# Patient Record
Sex: FEMALE | Race: WHITE | NOT HISPANIC OR LATINO | Employment: FULL TIME | URBAN - NONMETROPOLITAN AREA
[De-identification: names, ages, dates, MRNs, and addresses within clinical notes are randomized per-mention and may not be internally consistent; named-entity substitution may affect disease eponyms.]

---

## 2017-02-18 ENCOUNTER — HOSPITAL ENCOUNTER (EMERGENCY)
Facility: HOSPITAL | Age: 42
Discharge: HOME | End: 2017-02-18
Attending: EMERGENCY MEDICINE | Admitting: EMERGENCY MEDICINE

## 2017-02-18 VITALS
TEMPERATURE: 97.9 F | RESPIRATION RATE: 18 BRPM | DIASTOLIC BLOOD PRESSURE: 79 MMHG | HEIGHT: 66 IN | BODY MASS INDEX: 22.5 KG/M2 | SYSTOLIC BLOOD PRESSURE: 122 MMHG | OXYGEN SATURATION: 99 % | HEART RATE: 99 BPM | WEIGHT: 140 LBS

## 2017-02-18 DIAGNOSIS — K02.9 PAIN DUE TO DENTAL CARIES: Primary | ICD-10-CM

## 2017-02-18 PROCEDURE — 99283 EMERGENCY DEPT VISIT LOW MDM: CPT

## 2017-02-18 RX ORDER — PENICILLIN V POTASSIUM 500 MG/1
500 TABLET ORAL 4 TIMES DAILY
Qty: 40 TABLET | Refills: 0 | Status: SHIPPED | OUTPATIENT
Start: 2017-02-18 | End: 2017-02-28

## 2017-02-18 RX ORDER — DICLOFENAC SODIUM 75 MG/1
75 TABLET, DELAYED RELEASE ORAL 2 TIMES DAILY PRN
Qty: 20 TABLET | Refills: 0 | Status: SHIPPED | OUTPATIENT
Start: 2017-02-18 | End: 2018-12-03

## 2017-02-18 RX ORDER — PENICILLIN V POTASSIUM 250 MG/1
500 TABLET ORAL ONCE
Status: COMPLETED | OUTPATIENT
Start: 2017-02-18 | End: 2017-02-18

## 2017-02-18 RX ORDER — NAPROXEN 250 MG/1
500 TABLET ORAL ONCE
Status: COMPLETED | OUTPATIENT
Start: 2017-02-18 | End: 2017-02-18

## 2017-02-18 RX ADMIN — PENICILLIN V POTASSIUM 500 MG: 250 TABLET ORAL at 08:55

## 2017-02-18 RX ADMIN — BENZOCAINE: 200 LIQUID DENTAL; ORAL; PERIODONTAL at 08:55

## 2017-02-18 RX ADMIN — NAPROXEN 500 MG: 250 TABLET ORAL at 08:55

## 2017-02-18 NOTE — ED PROVIDER NOTES
Subjective   Patient is a 41 y.o. female presenting with tooth pain.   History provided by:  Patient   used: No    Dental Pain   Location:  Generalized  Quality:  Aching, constant and throbbing  Severity:  Moderate  Onset quality:  Gradual  Duration:  3 weeks  Timing:  Intermittent  Progression:  Worsening  Chronicity:  Recurrent  Context: dental caries    Context: not abscess, cap still on, not crown fracture, not dental fracture, not enamel fracture, filling intact, not intrusion, not malocclusion, normal dentition, not recent dental surgery and not trauma    Relieved by:  Nothing  Worsened by:  Nothing  Ineffective treatments:  None tried  Associated symptoms: facial pain and gum swelling    Associated symptoms: no congestion, no difficulty swallowing, no drooling, no facial swelling, no fever, no headaches, no neck pain, no neck swelling, no oral bleeding, no oral lesions and no trismus    Risk factors: no alcohol problem, no cancer, no chewing tobacco use, no diabetes, no immunosuppression, sufficient dental care, no periodontal disease and no smoking        Review of Systems   Constitutional: Negative for fever.   HENT: Negative for congestion, drooling, facial swelling and mouth sores.    Musculoskeletal: Negative for neck pain.   Neurological: Negative for headaches.   All other systems reviewed and are negative.      Past Medical History   Diagnosis Date   • Cancer      surgery to remove cervical cancer       Allergies   Allergen Reactions   • Codeine      States she does not like the way it makes her feel       Past Surgical History   Procedure Laterality Date   • Dilatation and curettage     • Tubal abdominal ligation         History reviewed. No pertinent family history.    Social History     Social History   • Marital status:      Spouse name: N/A   • Number of children: N/A   • Years of education: N/A     Social History Main Topics   • Smoking status: Current Every Day  Smoker     Packs/day: 1.00     Types: Cigarettes   • Smokeless tobacco: None   • Alcohol use No   • Drug use: No   • Sexual activity: Defer     Other Topics Concern   • None     Social History Narrative   • None           Objective   Physical Exam   Constitutional: She is oriented to person, place, and time. Vital signs are normal. She appears well-developed and well-nourished.   HENT:   Head: Normocephalic and atraumatic.   Right Ear: External ear normal.   Left Ear: External ear normal.   Nose: Nose normal.   Mouth/Throat: Oropharynx is clear and moist. Dental caries present.       Eyes: Conjunctivae and EOM are normal. Pupils are equal, round, and reactive to light.   Neck: Normal range of motion.   Cardiovascular: Normal rate and normal heart sounds.    Pulmonary/Chest: Effort normal and breath sounds normal.   Abdominal: Soft.   Neurological: She is oriented to person, place, and time.   Skin: Skin is warm.   Nursing note and vitals reviewed.      Procedures         ED Course  ED Course                  MDM  Number of Diagnoses or Management Options  Pain due to dental caries: new and does not require workup     Amount and/or Complexity of Data Reviewed  Tests in the medicine section of CPT®: ordered and reviewed    Risk of Complications, Morbidity, and/or Mortality  Presenting problems: moderate  Diagnostic procedures: moderate  Management options: moderate    Patient Progress  Patient progress: stable      Final diagnoses:   Pain due to dental caries            MOE Zamarripa  02/18/17 1537

## 2018-12-03 ENCOUNTER — OFFICE VISIT (OUTPATIENT)
Dept: FAMILY MEDICINE CLINIC | Facility: CLINIC | Age: 43
End: 2018-12-03

## 2018-12-03 VITALS
BODY MASS INDEX: 28.53 KG/M2 | DIASTOLIC BLOOD PRESSURE: 84 MMHG | HEART RATE: 86 BPM | SYSTOLIC BLOOD PRESSURE: 123 MMHG | TEMPERATURE: 98.2 F | WEIGHT: 177.5 LBS | HEIGHT: 66 IN

## 2018-12-03 DIAGNOSIS — S46.911A STRAIN OF RIGHT SHOULDER, INITIAL ENCOUNTER: Primary | ICD-10-CM

## 2018-12-03 PROBLEM — I21.9 MYOCARDIAL INFARCTION: Status: ACTIVE | Noted: 2018-12-03

## 2018-12-03 PROBLEM — S66.919A: Status: ACTIVE | Noted: 2018-12-03

## 2018-12-03 PROBLEM — C53.9 MALIGNANT NEOPLASM OF CERVIX (HCC): Status: ACTIVE | Noted: 2018-12-03

## 2018-12-03 PROCEDURE — 99214 OFFICE O/P EST MOD 30 MIN: CPT | Performed by: FAMILY MEDICINE

## 2018-12-03 RX ORDER — NAPROXEN 500 MG/1
500 TABLET ORAL 2 TIMES DAILY WITH MEALS
Qty: 30 TABLET | Refills: 1 | Status: SHIPPED | OUTPATIENT
Start: 2018-12-03 | End: 2018-12-06

## 2018-12-03 NOTE — PROGRESS NOTES
Subjective   Vicki Dietrich is a 43 y.o. female.     History of Present Illness earlier this a.m. 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  The resident almost fell and had to bear significant weight with right shoulder.  Pain about the right shoulder mechanical in nature.  No direct trauma.  Did not fall.  Denies neck axial spine elbow wrist difficulties.  Denies chest.  Has had no prior shoulder difficulties.  Has had prior back difficulties.    The following portions of the patient's history were reviewed and updated as appropriate: allergies, past family history, past medical history, past social history, past surgical history and problem list.    Review of Systems see the history of present illness    Objective   Physical Exam   Constitutional: She is oriented to person, place, and time. She appears well-developed and well-nourished.   HENT:   Head: Normocephalic.   Mouth/Throat: Oropharynx is clear and moist.   Neck: Normal range of motion. Neck supple.   Cardiovascular: Normal rate, regular rhythm and normal heart sounds.   Pulmonary/Chest: Effort normal and breath sounds normal.   Musculoskeletal: She exhibits no edema.   Painful range of motion right shoulder in all planes.  Soft tissue tenderness diffusely.  Decreased active passive range of motion secondary to pain.   Neurological: She is alert and oriented to person, place, and time. She displays normal reflexes.   Skin: Skin is warm and dry.   Psychiatric: She has a normal mood and affect. Her behavior is normal.   Vitals reviewed.      Assessment/Plan   Vicki was seen today for shoulder injury.    Diagnoses and all orders for this visit:    Strain of right shoulder, initial encounter  -     naproxen (NAPROSYN) 500 MG tablet; Take 1 tablet by mouth 2 (Two) Times a Day With Meals.     Will return to work with restrictions of  light duty no lifting.  Recheck in 3 days.  Use episodic icing and/or heat for comfort.  Discussed gentle range of motion activities.  Is the anti-inflammatory as directed.  I do not believe imaging is indicated at this time.  Likely a self-limited soft tissue injury.    Patient's Body mass index is 28.66 kg/m². BMI is above normal parameters. Recommendations include: referral to primary care.

## 2018-12-06 ENCOUNTER — OFFICE VISIT (OUTPATIENT)
Dept: FAMILY MEDICINE CLINIC | Facility: CLINIC | Age: 43
End: 2018-12-06

## 2018-12-06 VITALS
SYSTOLIC BLOOD PRESSURE: 131 MMHG | BODY MASS INDEX: 28.67 KG/M2 | HEIGHT: 66 IN | DIASTOLIC BLOOD PRESSURE: 82 MMHG | WEIGHT: 178.4 LBS | TEMPERATURE: 97.6 F | HEART RATE: 71 BPM

## 2018-12-06 DIAGNOSIS — S46.911A STRAIN OF RIGHT SHOULDER, INITIAL ENCOUNTER: Primary | ICD-10-CM

## 2018-12-06 PROCEDURE — 99213 OFFICE O/P EST LOW 20 MIN: CPT | Performed by: FAMILY MEDICINE

## 2018-12-06 RX ORDER — IBUPROFEN 600 MG/1
600 TABLET ORAL EVERY 6 HOURS PRN
Qty: 45 TABLET | Refills: 1 | Status: SHIPPED | OUTPATIENT
Start: 2018-12-06 | End: 2019-08-01

## 2019-08-01 ENCOUNTER — OFFICE VISIT (OUTPATIENT)
Dept: FAMILY MEDICINE CLINIC | Facility: CLINIC | Age: 44
End: 2019-08-01

## 2019-08-01 VITALS
SYSTOLIC BLOOD PRESSURE: 142 MMHG | WEIGHT: 177.5 LBS | BODY MASS INDEX: 28.53 KG/M2 | HEART RATE: 103 BPM | HEIGHT: 66 IN | DIASTOLIC BLOOD PRESSURE: 90 MMHG | TEMPERATURE: 98.7 F | OXYGEN SATURATION: 98 %

## 2019-08-01 DIAGNOSIS — M54.16 LUMBAR BACK PAIN WITH RADICULOPATHY AFFECTING LEFT LOWER EXTREMITY: Primary | ICD-10-CM

## 2019-08-01 PROCEDURE — 99214 OFFICE O/P EST MOD 30 MIN: CPT | Performed by: NURSE PRACTITIONER

## 2019-08-01 RX ORDER — IBUPROFEN 800 MG/1
800 TABLET ORAL EVERY 6 HOURS PRN
Qty: 90 TABLET | Refills: 0 | Status: SHIPPED | OUTPATIENT
Start: 2019-08-01 | End: 2019-09-05

## 2019-08-01 RX ORDER — CYCLOBENZAPRINE HCL 10 MG
10 TABLET ORAL 3 TIMES DAILY PRN
Qty: 90 TABLET | Refills: 0 | Status: SHIPPED | OUTPATIENT
Start: 2019-08-01 | End: 2019-09-05

## 2019-08-01 NOTE — PATIENT INSTRUCTIONS

## 2019-08-01 NOTE — PROGRESS NOTES
Noel Dietrich is a 43 y.o. female.     Chief Complaint   Patient presents with   • back injury (WC)       She presents with c/o back pain. She states she was pushing a resident in a low profile wheelchair and pulled her back and developed back pain. She describes the pain as sharp in her low back and running down the side of her left leg. She states the pain went away and when she came back she was taking a resident to the bathroom that she had to lift a little bit and the pain came and she couldn't move. She states she grabbed something and held on to it. She states she has felt paralyzed before about 3 or 4 years ago. She denies any popping sound. She states she has not taken any medicine. She rates her pain at 8 on a scale of 0-10 right now.          The following portions of the patient's history were reviewed and updated as appropriate: allergies, current medications, past family history, past medical history, past social history, past surgical history and problem list.    Review of Systems   Constitutional: Negative for fatigue, fever and unexpected weight change.   HENT: Negative for ear pain, rhinorrhea and sore throat.    Eyes: Negative for discharge, itching and visual disturbance.   Respiratory: Negative for cough, chest tightness and shortness of breath.    Cardiovascular: Negative for chest pain, palpitations and leg swelling.   Gastrointestinal: Negative for abdominal pain, blood in stool, constipation, diarrhea, nausea and vomiting.   Endocrine: Negative for cold intolerance and heat intolerance.   Genitourinary: Negative for dysuria and hematuria.   Musculoskeletal: Positive for arthralgias and back pain. Negative for myalgias.   Skin: Negative for color change and rash.   Allergic/Immunologic: Positive for environmental allergies.   Neurological: Negative for dizziness, seizures, syncope and headaches.   Hematological: Negative for adenopathy.   Psychiatric/Behavioral: Negative for  "suicidal ideas. The patient is not nervous/anxious.        Objective     /90 (BP Location: Left arm, Patient Position: Sitting, Cuff Size: Adult)   Pulse 103   Temp 98.7 °F (37.1 °C) (Oral)   Ht 167.6 cm (65.98\")   Wt 80.5 kg (177 lb 8 oz)   SpO2 98%   BMI 28.66 kg/m²     Physical Exam   Constitutional: She is oriented to person, place, and time. She appears well-developed and well-nourished. No distress.   HENT:   Head: Normocephalic and atraumatic.   Cardiovascular: Normal rate, regular rhythm, normal heart sounds and intact distal pulses. Exam reveals no gallop and no friction rub.   No murmur heard.  Pulmonary/Chest: Effort normal and breath sounds normal. No respiratory distress.   Abdominal: Soft. Bowel sounds are normal. She exhibits no distension. There is no tenderness.   Musculoskeletal: She exhibits no edema.   Left buttock tenderness to palpation, muscle tightness noted. Pain with elevation of left hip.   Neurological: She is alert and oriented to person, place, and time.   Reflex Scores:       Patellar reflexes are 2+ on the right side and 3+ on the left side.  Skin: Skin is warm and dry. She is not diaphoretic.   Psychiatric: She has a normal mood and affect. Her behavior is normal. Judgment and thought content normal.       Assessment/Plan     Problem List Items Addressed This Visit     None      Visit Diagnoses     Lumbar back pain with radiculopathy affecting left lower extremity    -  Primary    Relevant Medications    ibuprofen (ADVIL,MOTRIN) 800 MG tablet    cyclobenzaprine (FLEXERIL) 10 MG tablet    Other Relevant Orders    XR Spine Lumbar 2 or 3 View        Plan: Rest, no lifting, pulling or tugging for one week. Ibuprofen and cyclobenzaprine ordered for pain. Get xray of lumbar spine.  You may alternate ice and heat for your back pain.  Follow up in one week.       Patient's Body mass index is 28.66 kg/m². BMI is above normal parameters. Recommendations include: educational " material.   (Normal BMI:  18.5-24.9, OW 25-29.9, Obesity 30 or greater)    Vicki Dietrich is a current cigarettes user.  She currently smokes 1 pack of cigarettes per day for a duration of 35 years. I have educated her on the risk of diseases from using tobacco products such as cancer, COPD and heart diease.     I advised her to quit and she is not willing to quit.    I spent 3  minutes counseling the patient.          Understands disease processes and need for medications.  Understands reasons for urgent and emergent care.  Patient (& family) verbalized agreement for treatment plan.   Emotional support and active listening provided.  Patient provided time to verbalize feelings.               This document has been electronically signed by WILLIAM Watosn   August 1, 2019 12:53 PM

## 2019-08-08 ENCOUNTER — OFFICE VISIT (OUTPATIENT)
Dept: FAMILY MEDICINE CLINIC | Facility: CLINIC | Age: 44
End: 2019-08-08

## 2019-08-08 VITALS
DIASTOLIC BLOOD PRESSURE: 62 MMHG | HEART RATE: 116 BPM | WEIGHT: 182.1 LBS | OXYGEN SATURATION: 97 % | SYSTOLIC BLOOD PRESSURE: 124 MMHG | BODY MASS INDEX: 29.27 KG/M2 | TEMPERATURE: 98 F | HEIGHT: 66 IN

## 2019-08-08 DIAGNOSIS — M54.50 ACUTE LEFT-SIDED LOW BACK PAIN WITHOUT SCIATICA: Primary | ICD-10-CM

## 2019-08-08 PROCEDURE — 99213 OFFICE O/P EST LOW 20 MIN: CPT | Performed by: NURSE PRACTITIONER

## 2019-08-08 NOTE — PROGRESS NOTES
Subjective   Vicki Dietrich is a 43 y.o. female.     Chief Complaint   Patient presents with   • lumbar back pain with radiculopathy affecting left lower ext       She presents for follow up of low back pain on the left side after an injury at work. She states her pain has improved from an 8 to a 6 on the pain scale. She states the ibuprofen and flexeril help her pain but they make her sleepy. She states she had an xray at [facility]. We do not yet have the report. She states she has not been lifting, pulling, or tugging. She states she has been using ice and a heating pad on her back. She states the pain is no longer in her leg but staying in her back now.          The following portions of the patient's history were reviewed and updated as appropriate: allergies, current medications, past family history, past medical history, past social history, past surgical history and problem list.    Review of Systems   Constitutional: Negative for fatigue, fever and unexpected weight change.   HENT: Negative for ear pain, rhinorrhea and sore throat.    Eyes: Negative for visual disturbance.   Respiratory: Negative for cough, chest tightness and shortness of breath.    Cardiovascular: Negative for chest pain, palpitations and leg swelling.   Gastrointestinal: Negative for abdominal pain, blood in stool, constipation, diarrhea, nausea and vomiting.   Endocrine: Negative for cold intolerance and heat intolerance.   Genitourinary: Negative for dysuria and hematuria.   Musculoskeletal: Positive for back pain. Negative for arthralgias and myalgias.   Skin: Negative for color change.   Allergic/Immunologic: Positive for environmental allergies.   Neurological: Negative for dizziness, seizures, syncope and headaches.   Hematological: Negative for adenopathy.   Psychiatric/Behavioral: Negative for suicidal ideas. The patient is not nervous/anxious.        Objective     /62 (BP Location: Left arm, Patient Position: Sitting,  "Cuff Size: Adult)   Pulse 116   Temp 98 °F (36.7 °C) (Oral)   Ht 167.6 cm (65.98\")   Wt 82.6 kg (182 lb 1.6 oz)   SpO2 97%   BMI 29.41 kg/m²     Physical Exam   Constitutional: She is oriented to person, place, and time. Vital signs are normal. She appears well-developed and well-nourished. No distress.   HENT:   Head: Normocephalic and atraumatic.   Cardiovascular: Normal rate, regular rhythm, S1 normal, S2 normal, normal heart sounds and intact distal pulses. Exam reveals no gallop and no friction rub.   No murmur heard.  Pulmonary/Chest: Effort normal and breath sounds normal. No respiratory distress.   Abdominal: Soft. Bowel sounds are normal. She exhibits no distension. There is no tenderness.   Musculoskeletal: She exhibits no edema.        Lumbar back: She exhibits tenderness (left side).   Neurological: She is alert and oriented to person, place, and time.   Reflex Scores:       Patellar reflexes are 2+ on the right side and 3+ on the left side.  Skin: Skin is warm and dry. She is not diaphoretic.   Psychiatric: She has a normal mood and affect. Her behavior is normal. Judgment and thought content normal.       Assessment/Plan     Problem List Items Addressed This Visit     None      Visit Diagnoses     Acute left-sided low back pain without sciatica    -  Primary    Relevant Orders    Ambulatory Referral to Physical Therapy (Completed)        Plan: Continue to rest and use ice on your back. Continue ibuprofen and flexeril as needed. Attempt to get xray results. Referred to physical therapy for back pain. You may return to work on light duty. Follow up in 4 weeks.       Patient's Body mass index is 29.41 kg/m². BMI is above normal parameters. Recommendations include: educational material.   (Normal BMI:  18.5-24.9, OW 25-29.9, Obesity 30 or greater)    Vicki Dietrich is a current cigarettes user.  She currently smokes 1 pack of cigarettes per day for a duration of 30 years. I have educated her on the " risk of diseases from using tobacco products such as cancer, COPD and heart diease.     I advised her to quit and she is not willing to quit.    I spent 3  minutes counseling the patient.          Understands disease processes and need for medications.  Understands reasons for urgent and emergent care.  Patient (& family) verbalized agreement for treatment plan.   Emotional support and active listening provided.  Patient provided time to verbalize feelings.               This document has been electronically signed by WILLIAM Watson   August 8, 2019 11:44 AM

## 2019-08-08 NOTE — PATIENT INSTRUCTIONS

## 2019-09-05 ENCOUNTER — OFFICE VISIT (OUTPATIENT)
Dept: FAMILY MEDICINE CLINIC | Facility: CLINIC | Age: 44
End: 2019-09-05

## 2019-09-05 VITALS
OXYGEN SATURATION: 98 % | TEMPERATURE: 97.6 F | WEIGHT: 179.6 LBS | DIASTOLIC BLOOD PRESSURE: 84 MMHG | HEIGHT: 66 IN | SYSTOLIC BLOOD PRESSURE: 160 MMHG | BODY MASS INDEX: 28.87 KG/M2 | HEART RATE: 87 BPM

## 2019-09-05 DIAGNOSIS — M54.16 LUMBAR BACK PAIN WITH RADICULOPATHY AFFECTING LEFT LOWER EXTREMITY: ICD-10-CM

## 2019-09-05 PROCEDURE — 99213 OFFICE O/P EST LOW 20 MIN: CPT | Performed by: NURSE PRACTITIONER

## 2019-09-05 RX ORDER — CYCLOBENZAPRINE HCL 10 MG
10 TABLET ORAL 3 TIMES DAILY PRN
Qty: 90 TABLET | Refills: 0 | Status: SHIPPED | OUTPATIENT
Start: 2019-09-05 | End: 2019-10-04 | Stop reason: SDUPTHER

## 2019-09-05 RX ORDER — NABUMETONE 750 MG/1
750 TABLET, FILM COATED ORAL 2 TIMES DAILY PRN
Qty: 60 TABLET | Refills: 0 | Status: SHIPPED | OUTPATIENT
Start: 2019-09-05 | End: 2020-05-29

## 2019-09-05 NOTE — PATIENT INSTRUCTIONS

## 2019-09-05 NOTE — PROGRESS NOTES
Subjective   Vicki Scott is a 43 y.o. female.     Chief Complaint   Patient presents with   • Back Pain       She presents for follow up of left low back pain. She states she has hurt her back twice at work since she was last here. 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 She states you have to pull at the shower chair to get it into the bathroom. She states she didn't report the first time. She states she was pulling the shower chair into the shower. 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 She states it started pulling on her back. She states the second time a lady stood up and started to fall and grabbed her to keep herself from falling. She states her back started hurting in the middle and pain shot down her back. She states her back is still hurting. She states she has been doing physical therapy for about a week. She states she started last Tuesday. She states it is helping a little. She states the ibuprofen puts her to sleep so she can't take it while she works.          The following portions of the patient's history were reviewed and updated as appropriate: allergies, current medications, past family history, past medical history, past social history, past surgical history and problem list.    Review of Systems   Constitutional: Negative for fatigue, fever and unexpected weight change.   HENT: Negative for ear pain, rhinorrhea and sore throat.    Eyes: Negative for visual disturbance.   Respiratory: Negative for cough, chest tightness and shortness of breath.    Cardiovascular: Negative for chest pain, palpitations and leg swelling.   Gastrointestinal: Negative for abdominal pain, blood in stool, constipation, diarrhea, nausea and vomiting.   Endocrine: Negative for cold intolerance and heat intolerance.   Genitourinary: Negative for dysuria and hematuria.   Musculoskeletal: Positive for  "arthralgias, back pain and myalgias.   Skin: Negative for color change.   Allergic/Immunologic: Positive for environmental allergies.   Neurological: Negative for dizziness, seizures, syncope and headaches.   Hematological: Negative for adenopathy.   Psychiatric/Behavioral: Negative for suicidal ideas. The patient is not nervous/anxious.        Objective     /84 (BP Location: Right arm, Patient Position: Sitting, Cuff Size: Adult)   Pulse 87   Temp 97.6 °F (36.4 °C)   Ht 167.6 cm (65.98\")   Wt 81.5 kg (179 lb 9.6 oz)   SpO2 98%   BMI 29.00 kg/m²     Physical Exam   Constitutional: She is oriented to person, place, and time. She appears well-developed and well-nourished. No distress.   HENT:   Head: Normocephalic and atraumatic.   Cardiovascular: Normal rate, regular rhythm, S1 normal, S2 normal, normal heart sounds and intact distal pulses. Exam reveals no gallop and no friction rub.   No murmur heard.  Pulmonary/Chest: Effort normal and breath sounds normal. No respiratory distress.   Abdominal: Soft. Normal appearance and bowel sounds are normal. She exhibits no distension. There is no tenderness.   Musculoskeletal: She exhibits no edema.   Neurological: She is alert and oriented to person, place, and time.   Skin: Skin is warm and dry. She is not diaphoretic.   Psychiatric: She has a normal mood and affect. Her behavior is normal. Judgment and thought content normal.   Vitals reviewed.      Assessment/Plan     Problem List Items Addressed This Visit     None      Visit Diagnoses     Lumbar back pain with radiculopathy affecting left lower extremity        Relevant Medications    cyclobenzaprine (FLEXERIL) 10 MG tablet    nabumetone (RELAFEN) 750 MG tablet          Plan: No work, lifting, pulling, or tugging for one month. Continue physical therapy. Change ibuprofen to nabumetone since ibuprofen makes her sleepy. Follow up in 4 weeks.     Patient's Body mass index is 29 kg/m². BMI is above normal " parameters. Recommendations include: educational material.   (Normal BMI:  18.5-24.9, OW 25-29.9, Obesity 30 or greater)    Vicki Scott is a current cigarettes user.  She currently smokes 1 pack of cigarettes per day for a duration of 30 years. I have educated her on the risk of diseases from using tobacco products such as cancer, COPD and heart diease.     I advised her to quit and she is not willing to quit.    I spent 1 minutes counseling the patient.          Understands disease processes and need for medications.  Understands reasons for urgent and emergent care.  Patient (& family) verbalized agreement for treatment plan.   Emotional support and active listening provided.  Patient provided time to verbalize feelings.               This document has been electronically signed by WILLIAM Watson   September 5, 2019 11:57 AM

## 2019-10-04 ENCOUNTER — OFFICE VISIT (OUTPATIENT)
Dept: FAMILY MEDICINE CLINIC | Facility: CLINIC | Age: 44
End: 2019-10-04

## 2019-10-04 VITALS
WEIGHT: 178.8 LBS | SYSTOLIC BLOOD PRESSURE: 170 MMHG | HEART RATE: 81 BPM | TEMPERATURE: 97.2 F | DIASTOLIC BLOOD PRESSURE: 98 MMHG | BODY MASS INDEX: 28.73 KG/M2 | OXYGEN SATURATION: 99 % | HEIGHT: 66 IN

## 2019-10-04 DIAGNOSIS — M54.16 LUMBAR BACK PAIN WITH RADICULOPATHY AFFECTING LEFT LOWER EXTREMITY: ICD-10-CM

## 2019-10-04 PROCEDURE — 99213 OFFICE O/P EST LOW 20 MIN: CPT | Performed by: NURSE PRACTITIONER

## 2019-10-04 RX ORDER — CYCLOBENZAPRINE HCL 10 MG
10 TABLET ORAL 3 TIMES DAILY PRN
Qty: 90 TABLET | Refills: 0 | Status: SHIPPED | OUTPATIENT
Start: 2019-10-04 | End: 2020-05-29

## 2019-10-04 RX ORDER — LIDOCAINE 50 MG/G
1 PATCH TOPICAL EVERY 24 HOURS
Qty: 30 PATCH | Refills: 5 | Status: SHIPPED | OUTPATIENT
Start: 2019-10-04 | End: 2020-05-29

## 2019-10-04 NOTE — PATIENT INSTRUCTIONS

## 2019-10-07 DIAGNOSIS — M54.50 ACUTE LEFT-SIDED LOW BACK PAIN WITHOUT SCIATICA: Primary | ICD-10-CM

## 2019-11-01 ENCOUNTER — OFFICE VISIT (OUTPATIENT)
Dept: FAMILY MEDICINE CLINIC | Facility: CLINIC | Age: 44
End: 2019-11-01

## 2019-11-01 VITALS
DIASTOLIC BLOOD PRESSURE: 98 MMHG | OXYGEN SATURATION: 97 % | TEMPERATURE: 97.3 F | HEIGHT: 66 IN | SYSTOLIC BLOOD PRESSURE: 160 MMHG | BODY MASS INDEX: 29.28 KG/M2 | HEART RATE: 101 BPM | WEIGHT: 182.2 LBS

## 2019-11-01 DIAGNOSIS — M54.16 LUMBAR BACK PAIN WITH RADICULOPATHY AFFECTING LEFT LOWER EXTREMITY: Primary | ICD-10-CM

## 2019-11-01 PROCEDURE — 99212 OFFICE O/P EST SF 10 MIN: CPT | Performed by: NURSE PRACTITIONER

## 2019-11-01 NOTE — PROGRESS NOTES
"Noel Scott is a 44 y.o. female.     Chief Complaint   Patient presents with   • Back Pain       She presents with c/o left low back pain with radiculopathy. She states the pain is about the same. She states she doesn't have any therapy visits left. Lidocaine patches are helping some. Worker's comp is trying to get her in with an orthopedist.          The following portions of the patient's history were reviewed and updated as appropriate: allergies, current medications, past family history, past medical history, past social history, past surgical history and problem list.    Review of Systems   Constitutional: Negative for fatigue, fever and unexpected weight change.   HENT: Negative for ear pain, rhinorrhea and sore throat.    Eyes: Negative for visual disturbance.   Respiratory: Negative for cough, chest tightness and shortness of breath.    Cardiovascular: Negative for chest pain, palpitations and leg swelling.   Gastrointestinal: Negative for abdominal pain, blood in stool, constipation, diarrhea, nausea and vomiting.   Endocrine: Negative for cold intolerance and heat intolerance.   Genitourinary: Negative for dysuria.   Musculoskeletal: Positive for arthralgias, back pain and myalgias.   Skin: Negative for color change.   Allergic/Immunologic: Negative for environmental allergies.   Neurological: Negative for dizziness and headaches.   Hematological: Negative for adenopathy.   Psychiatric/Behavioral: Negative for suicidal ideas. The patient is not nervous/anxious.        Objective     /98 (BP Location: Right arm, Patient Position: Sitting, Cuff Size: Adult)   Pulse 101   Temp 97.3 °F (36.3 °C)   Ht 167.9 cm (66.1\")   Wt 82.6 kg (182 lb 3.2 oz)   SpO2 97%   BMI 29.32 kg/m²     Physical Exam   Constitutional: She is oriented to person, place, and time. She appears well-developed and well-nourished. No distress.   HENT:   Head: Normocephalic and atraumatic.   Cardiovascular: Normal " rate, regular rhythm, S1 normal, S2 normal, normal heart sounds and intact distal pulses. Exam reveals no gallop and no friction rub.   No murmur heard.  Pulmonary/Chest: Effort normal and breath sounds normal. No respiratory distress.   Abdominal: Soft. Normal appearance and bowel sounds are normal. She exhibits no distension. There is no tenderness.   Musculoskeletal: She exhibits no edema.   Left lumbar tenderness to palpation   Neurological: She is alert and oriented to person, place, and time.   Skin: Skin is warm and dry. She is not diaphoretic.   Psychiatric: She has a normal mood and affect. Her behavior is normal. Judgment and thought content normal.   Vitals reviewed.      Assessment/Plan     Problem List Items Addressed This Visit        Nervous and Auditory    Lumbar back pain with radiculopathy affecting left lower extremity - Primary          Plan: Proceed with referral to orthopedics. Rest. No lifting, pulling, or tugging. No work for now. Follow up in 2 months.    Patient's Body mass index is 29.32 kg/m². BMI is above normal parameters. Recommendations include: educational material.   (Normal BMI:  18.5-24.9, OW 25-29.9, Obesity 30 or greater)    Vicki Scott is a current cigarettes user.  She currently smokes 1 pack of cigarettes per day for a duration of 30 years. I have educated her on the risk of diseases from using tobacco products such as cancer, COPD and heart diease.     I advised her to quit and she is not willing to quit.    I spent 0 minutes counseling the patient.          Understands disease processes and need for medications.  Understands reasons for urgent and emergent care.  Patient (& family) verbalized agreement for treatment plan.   Emotional support and active listening provided.  Patient provided time to verbalize feelings.               This document has been electronically signed by WILLIAM Watson   November 1, 2019 11:03 AM

## 2019-11-01 NOTE — PATIENT INSTRUCTIONS

## 2019-12-11 ENCOUNTER — TELEPHONE (OUTPATIENT)
Dept: FAMILY MEDICINE CLINIC | Facility: CLINIC | Age: 44
End: 2019-12-11

## 2019-12-11 NOTE — TELEPHONE ENCOUNTER
ROSA SAW DR DUBIN, HE SAYS SHE HAS MASS ON OVARY THAT IS CAUSING HER BACK PAIN. SHE IS SEEING GYN TOMORROW.

## 2019-12-19 ENCOUNTER — TELEPHONE (OUTPATIENT)
Dept: FAMILY MEDICINE CLINIC | Facility: CLINIC | Age: 44
End: 2019-12-19

## 2019-12-30 ENCOUNTER — TELEPHONE (OUTPATIENT)
Dept: FAMILY MEDICINE CLINIC | Facility: CLINIC | Age: 44
End: 2019-12-30

## 2019-12-31 ENCOUNTER — OFFICE VISIT (OUTPATIENT)
Dept: FAMILY MEDICINE CLINIC | Facility: CLINIC | Age: 44
End: 2019-12-31

## 2019-12-31 VITALS
SYSTOLIC BLOOD PRESSURE: 138 MMHG | HEIGHT: 66 IN | HEART RATE: 116 BPM | WEIGHT: 183.3 LBS | OXYGEN SATURATION: 99 % | DIASTOLIC BLOOD PRESSURE: 76 MMHG | BODY MASS INDEX: 29.46 KG/M2 | TEMPERATURE: 97.8 F

## 2019-12-31 DIAGNOSIS — M54.16 LUMBAR BACK PAIN WITH RADICULOPATHY AFFECTING LEFT LOWER EXTREMITY: Primary | ICD-10-CM

## 2019-12-31 DIAGNOSIS — N83.201 CYST OF RIGHT OVARY: ICD-10-CM

## 2019-12-31 DIAGNOSIS — D25.9 UTERINE LEIOMYOMA, UNSPECIFIED LOCATION: ICD-10-CM

## 2019-12-31 PROCEDURE — 99213 OFFICE O/P EST LOW 20 MIN: CPT | Performed by: NURSE PRACTITIONER

## 2019-12-31 NOTE — PROGRESS NOTES
"Noel Scott is a 44 y.o. female.     Chief Complaint   Patient presents with   • Back Pain       She presents for follow up of low back pain. She states she saw Dr. Dubin who did an MRI and told her he did not think she needs surgery but ordered her a back brace that she has not received yet. She states he told her she had a mass on her ovary and she saw her gynecologist who told her he did not work on anything but cancer. She states her back still hurts a little. She states the lidoderm patches help the pain some.        The following portions of the patient's history were reviewed and updated as appropriate: allergies, current medications, past family history, past medical history, past social history, past surgical history and problem list.    Review of Systems   Constitutional: Negative for fatigue, fever and unexpected weight change.   HENT: Negative for ear pain, rhinorrhea and sore throat.    Eyes: Negative for visual disturbance.   Respiratory: Negative for cough, chest tightness and shortness of breath.    Cardiovascular: Positive for palpitations. Negative for chest pain and leg swelling.   Gastrointestinal: Negative for abdominal pain, blood in stool, constipation, diarrhea, nausea and vomiting.   Endocrine: Negative for cold intolerance and heat intolerance.   Genitourinary: Negative for dysuria, hematuria and vaginal bleeding.   Musculoskeletal: Positive for arthralgias and back pain. Negative for myalgias.   Skin: Negative for color change.   Allergic/Immunologic: Negative for environmental allergies.   Hematological: Negative for adenopathy.   Psychiatric/Behavioral: Negative for suicidal ideas. The patient is not nervous/anxious.        Objective     /76 (BP Location: Right arm, Patient Position: Sitting, Cuff Size: Adult)   Pulse 116   Temp 97.8 °F (36.6 °C)   Ht 167.9 cm (66.1\")   Wt 83.1 kg (183 lb 4.8 oz)   SpO2 99%   BMI 29.49 kg/m²     Physical Exam   Constitutional: " She is oriented to person, place, and time. She appears well-developed and well-nourished. No distress.   HENT:   Head: Normocephalic and atraumatic.   Cardiovascular: Normal rate, regular rhythm, S1 normal, S2 normal, normal heart sounds and intact distal pulses. Exam reveals no gallop and no friction rub.   No murmur heard.  Pulmonary/Chest: Effort normal and breath sounds normal. No respiratory distress.   Abdominal: Soft. Normal appearance and bowel sounds are normal. She exhibits no distension. There is no tenderness.   Musculoskeletal: She exhibits no edema.   Neurological: She is alert and oriented to person, place, and time.   Skin: Skin is warm and dry. She is not diaphoretic.   Psychiatric: She has a normal mood and affect. Her behavior is normal. Judgment and thought content normal.   Vitals reviewed.      Assessment/Plan     Problem List Items Addressed This Visit        Nervous and Auditory    Lumbar back pain with radiculopathy affecting left lower extremity - Primary    Relevant Orders    Ambulatory Referral to Neurosurgery    Ambulatory Referral to Pain Management      Other Visit Diagnoses     Uterine leiomyoma, unspecified location        Relevant Orders    Ambulatory Referral to Gynecology    Cyst of right ovary        Relevant Orders    Ambulatory Referral to Gynecology          Plan: MRI of lumbar spine reviewed. It reports contact of the exiting left L4 nerve at the far lateral foramen with small associated protrusion. Tiny central, left parasagittal annular rent L5-S1, facet arthrosis L5-S1, L4-5, right ovarian cyst 2.6cm and right uterine fibroid 16mm. Refer to neurosurgery for opinion. Refer to pain management for pain. May return to work with limitations. Refer to gynecology for ovarian cyst and uterine fibroid. Follow up as needed.     Patient's Body mass index is 29.49 kg/m². BMI is above normal parameters. Recommendations include: educational material.   (Normal BMI:  18.5-24.9, OW  25-29.9, Obesity 30 or greater)    Vicki Scott  reports that she has been smoking cigarettes. She has a 30.00 pack-year smoking history. She has never used smokeless tobacco.. I have educated her on the risk of diseases from using tobacco products such as cancer, COPD and heart diease.     I advised her to quit and she is not willing to quit.    I spent 1 minutes counseling the patient.           Understands disease processes and need for medications.  Understands reasons for urgent and emergent care.  Patient (& family) verbalized agreement for treatment plan.   Emotional support and active listening provided.  Patient provided time to verbalize feelings.               This document has been electronically signed by WILLIAM Watson   December 31, 2019 10:54 AM

## 2019-12-31 NOTE — PATIENT INSTRUCTIONS

## 2020-05-28 ENCOUNTER — TRANSCRIBE ORDERS (OUTPATIENT)
Dept: ADMINISTRATIVE | Facility: HOSPITAL | Age: 45
End: 2020-05-28

## 2020-05-28 DIAGNOSIS — Z01.818 OTHER SPECIFIED PRE-OPERATIVE EXAMINATION: Primary | ICD-10-CM

## 2020-05-29 ENCOUNTER — TRANSCRIBE ORDERS (OUTPATIENT)
Dept: ADMINISTRATIVE | Facility: HOSPITAL | Age: 45
End: 2020-05-29

## 2020-05-29 ENCOUNTER — APPOINTMENT (OUTPATIENT)
Dept: PREADMISSION TESTING | Facility: HOSPITAL | Age: 45
End: 2020-05-29

## 2020-05-29 ENCOUNTER — LAB (OUTPATIENT)
Dept: LAB | Facility: HOSPITAL | Age: 45
End: 2020-05-29

## 2020-05-29 DIAGNOSIS — Z01.818 PRE-OPERATIVE CLEARANCE: ICD-10-CM

## 2020-05-29 DIAGNOSIS — Z01.818 PRE-OPERATIVE CLEARANCE: Primary | ICD-10-CM

## 2020-05-29 DIAGNOSIS — Z01.818 OTHER SPECIFIED PRE-OPERATIVE EXAMINATION: ICD-10-CM

## 2020-05-29 LAB
ABO GROUP BLD: NORMAL
BLD GP AB SCN SERPL QL: NEGATIVE
RH BLD: POSITIVE
T&S EXPIRATION DATE: NORMAL

## 2020-05-29 PROCEDURE — 86901 BLOOD TYPING SEROLOGIC RH(D): CPT

## 2020-05-29 PROCEDURE — 86850 RBC ANTIBODY SCREEN: CPT

## 2020-05-29 PROCEDURE — 80053 COMPREHEN METABOLIC PANEL: CPT

## 2020-05-29 PROCEDURE — 36415 COLL VENOUS BLD VENIPUNCTURE: CPT

## 2020-05-29 PROCEDURE — C9803 HOPD COVID-19 SPEC COLLECT: HCPCS

## 2020-05-29 PROCEDURE — 85025 COMPLETE CBC W/AUTO DIFF WBC: CPT

## 2020-05-29 PROCEDURE — 84703 CHORIONIC GONADOTROPIN ASSAY: CPT

## 2020-05-29 PROCEDURE — 86900 BLOOD TYPING SEROLOGIC ABO: CPT

## 2020-05-29 PROCEDURE — U0002 COVID-19 LAB TEST NON-CDC: HCPCS

## 2020-05-29 PROCEDURE — U0004 COV-19 TEST NON-CDC HGH THRU: HCPCS

## 2020-05-30 LAB
ALBUMIN SERPL-MCNC: 4.3 G/DL (ref 3.5–5.2)
ALBUMIN/GLOB SERPL: 1.6 G/DL
ALP SERPL-CCNC: 57 U/L (ref 39–117)
ALT SERPL W P-5'-P-CCNC: 33 U/L (ref 1–33)
ANION GAP SERPL CALCULATED.3IONS-SCNC: 14 MMOL/L (ref 5–15)
AST SERPL-CCNC: 27 U/L (ref 1–32)
BASOPHILS # BLD AUTO: 0.05 10*3/MM3 (ref 0–0.2)
BASOPHILS NFR BLD AUTO: 0.5 % (ref 0–1.5)
BILIRUB SERPL-MCNC: 0.5 MG/DL (ref 0.2–1.2)
BUN BLD-MCNC: 4 MG/DL (ref 6–20)
BUN/CREAT SERPL: 4.9 (ref 7–25)
CALCIUM SPEC-SCNC: 9.7 MG/DL (ref 8.6–10.5)
CHLORIDE SERPL-SCNC: 102 MMOL/L (ref 98–107)
CO2 SERPL-SCNC: 24 MMOL/L (ref 22–29)
CREAT BLD-MCNC: 0.82 MG/DL (ref 0.57–1)
DEPRECATED RDW RBC AUTO: 41.2 FL (ref 37–54)
EOSINOPHIL # BLD AUTO: 0.2 10*3/MM3 (ref 0–0.4)
EOSINOPHIL NFR BLD AUTO: 2.1 % (ref 0.3–6.2)
ERYTHROCYTE [DISTWIDTH] IN BLOOD BY AUTOMATED COUNT: 12.4 % (ref 12.3–15.4)
GFR SERPL CREATININE-BSD FRML MDRD: 76 ML/MIN/1.73
GLOBULIN UR ELPH-MCNC: 2.7 GM/DL
GLUCOSE BLD-MCNC: 81 MG/DL (ref 65–99)
HCG SERPL QL: NEGATIVE
HCT VFR BLD AUTO: 42.2 % (ref 34–46.6)
HGB BLD-MCNC: 14.4 G/DL (ref 12–15.9)
IMM GRANULOCYTES # BLD AUTO: 0.01 10*3/MM3 (ref 0–0.05)
IMM GRANULOCYTES NFR BLD AUTO: 0.1 % (ref 0–0.5)
LYMPHOCYTES # BLD AUTO: 3.12 10*3/MM3 (ref 0.7–3.1)
LYMPHOCYTES NFR BLD AUTO: 33.1 % (ref 19.6–45.3)
MCH RBC QN AUTO: 30.6 PG (ref 26.6–33)
MCHC RBC AUTO-ENTMCNC: 34.1 G/DL (ref 31.5–35.7)
MCV RBC AUTO: 89.8 FL (ref 79–97)
MONOCYTES # BLD AUTO: 0.69 10*3/MM3 (ref 0.1–0.9)
MONOCYTES NFR BLD AUTO: 7.3 % (ref 5–12)
NEUTROPHILS # BLD AUTO: 5.37 10*3/MM3 (ref 1.7–7)
NEUTROPHILS NFR BLD AUTO: 56.9 % (ref 42.7–76)
NRBC BLD AUTO-RTO: 0 /100 WBC (ref 0–0.2)
PLATELET # BLD AUTO: 341 10*3/MM3 (ref 140–450)
PMV BLD AUTO: 11.1 FL (ref 6–12)
POTASSIUM BLD-SCNC: 3.3 MMOL/L (ref 3.5–5.2)
PROT SERPL-MCNC: 7 G/DL (ref 6–8.5)
RBC # BLD AUTO: 4.7 10*6/MM3 (ref 3.77–5.28)
REF LAB TEST METHOD: NORMAL
SARS-COV-2 RNA RESP QL NAA+PROBE: NOT DETECTED
SODIUM BLD-SCNC: 140 MMOL/L (ref 136–145)
WBC NRBC COR # BLD: 9.44 10*3/MM3 (ref 3.4–10.8)

## 2020-05-31 ENCOUNTER — PREP FOR SURGERY (OUTPATIENT)
Dept: OTHER | Facility: HOSPITAL | Age: 45
End: 2020-05-31

## 2020-05-31 DIAGNOSIS — N85.01 SIMPLE ENDOMETRIAL HYPERPLASIA WITHOUT ATYPIA: Primary | ICD-10-CM

## 2020-05-31 RX ORDER — SODIUM CHLORIDE 0.9 % (FLUSH) 0.9 %
10 SYRINGE (ML) INJECTION AS NEEDED
Status: CANCELLED | OUTPATIENT
Start: 2020-05-31

## 2020-05-31 RX ORDER — SODIUM CHLORIDE 0.9 % (FLUSH) 0.9 %
3 SYRINGE (ML) INJECTION EVERY 12 HOURS SCHEDULED
Status: CANCELLED | OUTPATIENT
Start: 2020-05-31

## 2020-06-02 ENCOUNTER — APPOINTMENT (OUTPATIENT)
Dept: GENERAL RADIOLOGY | Facility: HOSPITAL | Age: 45
End: 2020-06-02

## 2020-06-02 ENCOUNTER — HOSPITAL ENCOUNTER (OUTPATIENT)
Facility: HOSPITAL | Age: 45
Discharge: HOME | End: 2020-06-03
Attending: OBSTETRICS & GYNECOLOGY | Admitting: OBSTETRICS & GYNECOLOGY

## 2020-06-02 ENCOUNTER — ANESTHESIA (OUTPATIENT)
Dept: PERIOP | Facility: HOSPITAL | Age: 45
End: 2020-06-02

## 2020-06-02 ENCOUNTER — ANESTHESIA EVENT (OUTPATIENT)
Dept: PERIOP | Facility: HOSPITAL | Age: 45
End: 2020-06-02

## 2020-06-02 DIAGNOSIS — N85.01 SIMPLE ENDOMETRIAL HYPERPLASIA WITHOUT ATYPIA: ICD-10-CM

## 2020-06-02 DIAGNOSIS — Z41.9 ELECTIVE SURGERY: Primary | ICD-10-CM

## 2020-06-02 PROCEDURE — 25010000002 NEOSTIGMINE 10 MG/10ML SOLUTION: Performed by: NURSE ANESTHETIST, CERTIFIED REGISTERED

## 2020-06-02 PROCEDURE — 25010000002 CEFOXITIN PER 1 G: Performed by: NURSE PRACTITIONER

## 2020-06-02 PROCEDURE — 25010000002 KETOROLAC TROMETHAMINE PER 15 MG: Performed by: NURSE ANESTHETIST, CERTIFIED REGISTERED

## 2020-06-02 PROCEDURE — 25010000002 HYDROMORPHONE PER 4 MG: Performed by: NURSE ANESTHETIST, CERTIFIED REGISTERED

## 2020-06-02 PROCEDURE — 25010000002 PROPOFOL 10 MG/ML EMULSION: Performed by: NURSE ANESTHETIST, CERTIFIED REGISTERED

## 2020-06-02 PROCEDURE — 25010000002 MIDAZOLAM PER 1 MG: Performed by: NURSE ANESTHETIST, CERTIFIED REGISTERED

## 2020-06-02 PROCEDURE — 25010000002 ONDANSETRON PER 1 MG: Performed by: NURSE ANESTHETIST, CERTIFIED REGISTERED

## 2020-06-02 PROCEDURE — G0378 HOSPITAL OBSERVATION PER HR: HCPCS

## 2020-06-02 PROCEDURE — 86900 BLOOD TYPING SEROLOGIC ABO: CPT | Performed by: OBSTETRICS & GYNECOLOGY

## 2020-06-02 PROCEDURE — 86901 BLOOD TYPING SEROLOGIC RH(D): CPT | Performed by: OBSTETRICS & GYNECOLOGY

## 2020-06-02 PROCEDURE — 25010000002 FENTANYL CITRATE (PF) 100 MCG/2ML SOLUTION: Performed by: NURSE ANESTHETIST, CERTIFIED REGISTERED

## 2020-06-02 PROCEDURE — 86850 RBC ANTIBODY SCREEN: CPT | Performed by: OBSTETRICS & GYNECOLOGY

## 2020-06-02 RX ORDER — KETOROLAC TROMETHAMINE 30 MG/ML
INJECTION, SOLUTION INTRAMUSCULAR; INTRAVENOUS AS NEEDED
Status: DISCONTINUED | OUTPATIENT
Start: 2020-06-02 | End: 2020-06-02 | Stop reason: SURG

## 2020-06-02 RX ORDER — IBUPROFEN 800 MG/1
800 TABLET ORAL EVERY 6 HOURS PRN
COMMUNITY
End: 2020-06-03 | Stop reason: HOSPADM

## 2020-06-02 RX ORDER — FENTANYL CITRATE 50 UG/ML
50 INJECTION, SOLUTION INTRAMUSCULAR; INTRAVENOUS
Status: DISCONTINUED | OUTPATIENT
Start: 2020-06-02 | End: 2020-06-02 | Stop reason: HOSPADM

## 2020-06-02 RX ORDER — BUPIVACAINE HYDROCHLORIDE AND EPINEPHRINE 5; 5 MG/ML; UG/ML
INJECTION, SOLUTION EPIDURAL; INTRACAUDAL; PERINEURAL AS NEEDED
Status: DISCONTINUED | OUTPATIENT
Start: 2020-06-02 | End: 2020-06-02 | Stop reason: HOSPADM

## 2020-06-02 RX ORDER — SODIUM CHLORIDE, SODIUM LACTATE, POTASSIUM CHLORIDE, CALCIUM CHLORIDE 600; 310; 30; 20 MG/100ML; MG/100ML; MG/100ML; MG/100ML
125 INJECTION, SOLUTION INTRAVENOUS CONTINUOUS
Status: DISCONTINUED | OUTPATIENT
Start: 2020-06-02 | End: 2020-06-03 | Stop reason: HOSPADM

## 2020-06-02 RX ORDER — FENTANYL CITRATE 50 UG/ML
INJECTION, SOLUTION INTRAMUSCULAR; INTRAVENOUS AS NEEDED
Status: DISCONTINUED | OUTPATIENT
Start: 2020-06-02 | End: 2020-06-02 | Stop reason: SURG

## 2020-06-02 RX ORDER — SODIUM CHLORIDE 0.9 % (FLUSH) 0.9 %
10 SYRINGE (ML) INJECTION EVERY 12 HOURS SCHEDULED
Status: DISCONTINUED | OUTPATIENT
Start: 2020-06-02 | End: 2020-06-02 | Stop reason: HOSPADM

## 2020-06-02 RX ORDER — ONDANSETRON 2 MG/ML
4 INJECTION INTRAMUSCULAR; INTRAVENOUS EVERY 6 HOURS PRN
Status: DISCONTINUED | OUTPATIENT
Start: 2020-06-02 | End: 2020-06-03 | Stop reason: HOSPADM

## 2020-06-02 RX ORDER — FAMOTIDINE 10 MG/ML
INJECTION, SOLUTION INTRAVENOUS AS NEEDED
Status: DISCONTINUED | OUTPATIENT
Start: 2020-06-02 | End: 2020-06-02 | Stop reason: SURG

## 2020-06-02 RX ORDER — IBUPROFEN 800 MG/1
800 TABLET ORAL 3 TIMES DAILY
Status: DISCONTINUED | OUTPATIENT
Start: 2020-06-02 | End: 2020-06-03 | Stop reason: HOSPADM

## 2020-06-02 RX ORDER — ONDANSETRON 4 MG/1
4 TABLET, FILM COATED ORAL EVERY 6 HOURS PRN
Status: DISCONTINUED | OUTPATIENT
Start: 2020-06-02 | End: 2020-06-03 | Stop reason: HOSPADM

## 2020-06-02 RX ORDER — ONDANSETRON 2 MG/ML
INJECTION INTRAMUSCULAR; INTRAVENOUS AS NEEDED
Status: DISCONTINUED | OUTPATIENT
Start: 2020-06-02 | End: 2020-06-02 | Stop reason: SURG

## 2020-06-02 RX ORDER — SODIUM CHLORIDE 0.9 % (FLUSH) 0.9 %
10 SYRINGE (ML) INJECTION AS NEEDED
Status: DISCONTINUED | OUTPATIENT
Start: 2020-06-02 | End: 2020-06-02 | Stop reason: HOSPADM

## 2020-06-02 RX ORDER — ROCURONIUM BROMIDE 10 MG/ML
INJECTION, SOLUTION INTRAVENOUS AS NEEDED
Status: DISCONTINUED | OUTPATIENT
Start: 2020-06-02 | End: 2020-06-02 | Stop reason: SURG

## 2020-06-02 RX ORDER — GLYCOPYRROLATE 0.2 MG/ML
INJECTION INTRAMUSCULAR; INTRAVENOUS AS NEEDED
Status: DISCONTINUED | OUTPATIENT
Start: 2020-06-02 | End: 2020-06-02 | Stop reason: SURG

## 2020-06-02 RX ORDER — NALOXONE HCL 0.4 MG/ML
0.4 VIAL (ML) INJECTION
Status: DISCONTINUED | OUTPATIENT
Start: 2020-06-02 | End: 2020-06-03 | Stop reason: HOSPADM

## 2020-06-02 RX ORDER — MAGNESIUM HYDROXIDE 1200 MG/15ML
LIQUID ORAL AS NEEDED
Status: DISCONTINUED | OUTPATIENT
Start: 2020-06-02 | End: 2020-06-02 | Stop reason: HOSPADM

## 2020-06-02 RX ORDER — SODIUM CHLORIDE 9 MG/ML
INJECTION, SOLUTION INTRAVENOUS AS NEEDED
Status: DISCONTINUED | OUTPATIENT
Start: 2020-06-02 | End: 2020-06-02 | Stop reason: HOSPADM

## 2020-06-02 RX ORDER — PROPOFOL 10 MG/ML
VIAL (ML) INTRAVENOUS AS NEEDED
Status: DISCONTINUED | OUTPATIENT
Start: 2020-06-02 | End: 2020-06-02 | Stop reason: SURG

## 2020-06-02 RX ORDER — HYDROMORPHONE HCL 110MG/55ML
PATIENT CONTROLLED ANALGESIA SYRINGE INTRAVENOUS AS NEEDED
Status: DISCONTINUED | OUTPATIENT
Start: 2020-06-02 | End: 2020-06-02 | Stop reason: SURG

## 2020-06-02 RX ORDER — IPRATROPIUM BROMIDE AND ALBUTEROL SULFATE 2.5; .5 MG/3ML; MG/3ML
3 SOLUTION RESPIRATORY (INHALATION) ONCE AS NEEDED
Status: DISCONTINUED | OUTPATIENT
Start: 2020-06-02 | End: 2020-06-02 | Stop reason: HOSPADM

## 2020-06-02 RX ORDER — OXYCODONE HYDROCHLORIDE AND ACETAMINOPHEN 5; 325 MG/1; MG/1
1 TABLET ORAL EVERY 4 HOURS PRN
Status: DISCONTINUED | OUTPATIENT
Start: 2020-06-02 | End: 2020-06-03 | Stop reason: HOSPADM

## 2020-06-02 RX ORDER — ONDANSETRON 2 MG/ML
4 INJECTION INTRAMUSCULAR; INTRAVENOUS AS NEEDED
Status: DISCONTINUED | OUTPATIENT
Start: 2020-06-02 | End: 2020-06-02 | Stop reason: HOSPADM

## 2020-06-02 RX ORDER — MEDROXYPROGESTERONE ACETATE 10 MG/1
10 TABLET ORAL DAILY
COMMUNITY
End: 2020-06-03 | Stop reason: HOSPADM

## 2020-06-02 RX ORDER — NICOTINE 21 MG/24HR
1 PATCH, TRANSDERMAL 24 HOURS TRANSDERMAL
Status: DISCONTINUED | OUTPATIENT
Start: 2020-06-02 | End: 2020-06-03 | Stop reason: HOSPADM

## 2020-06-02 RX ORDER — SIMETHICONE 80 MG
80 TABLET,CHEWABLE ORAL 4 TIMES DAILY PRN
Status: DISCONTINUED | OUTPATIENT
Start: 2020-06-02 | End: 2020-06-03 | Stop reason: HOSPADM

## 2020-06-02 RX ORDER — SODIUM CHLORIDE 0.9 % (FLUSH) 0.9 %
3 SYRINGE (ML) INJECTION EVERY 12 HOURS SCHEDULED
Status: DISCONTINUED | OUTPATIENT
Start: 2020-06-02 | End: 2020-06-02 | Stop reason: HOSPADM

## 2020-06-02 RX ORDER — NEOSTIGMINE METHYLSULFATE 1 MG/ML
INJECTION, SOLUTION INTRAVENOUS AS NEEDED
Status: DISCONTINUED | OUTPATIENT
Start: 2020-06-02 | End: 2020-06-02 | Stop reason: SURG

## 2020-06-02 RX ORDER — AMOXICILLIN 250 MG
2 CAPSULE ORAL 2 TIMES DAILY PRN
Status: DISCONTINUED | OUTPATIENT
Start: 2020-06-02 | End: 2020-06-03 | Stop reason: HOSPADM

## 2020-06-02 RX ORDER — MIDAZOLAM HYDROCHLORIDE 1 MG/ML
INJECTION INTRAMUSCULAR; INTRAVENOUS AS NEEDED
Status: DISCONTINUED | OUTPATIENT
Start: 2020-06-02 | End: 2020-06-02 | Stop reason: SURG

## 2020-06-02 RX ORDER — MEPERIDINE HYDROCHLORIDE 25 MG/ML
12.5 INJECTION INTRAMUSCULAR; INTRAVENOUS; SUBCUTANEOUS
Status: DISCONTINUED | OUTPATIENT
Start: 2020-06-02 | End: 2020-06-02 | Stop reason: HOSPADM

## 2020-06-02 RX ORDER — HYDROMORPHONE HYDROCHLORIDE 1 MG/ML
0.5 INJECTION, SOLUTION INTRAMUSCULAR; INTRAVENOUS; SUBCUTANEOUS
Status: DISCONTINUED | OUTPATIENT
Start: 2020-06-02 | End: 2020-06-03 | Stop reason: HOSPADM

## 2020-06-02 RX ADMIN — FENTANYL CITRATE 50 MCG: 50 INJECTION INTRAMUSCULAR; INTRAVENOUS at 08:43

## 2020-06-02 RX ADMIN — IBUPROFEN 800 MG: 800 TABLET, FILM COATED ORAL at 15:37

## 2020-06-02 RX ADMIN — MIDAZOLAM HYDROCHLORIDE 2 MG: 1 INJECTION, SOLUTION INTRAMUSCULAR; INTRAVENOUS at 07:37

## 2020-06-02 RX ADMIN — FAMOTIDINE 20 MG: 10 INJECTION INTRAVENOUS at 07:42

## 2020-06-02 RX ADMIN — SODIUM CHLORIDE, POTASSIUM CHLORIDE, SODIUM LACTATE AND CALCIUM CHLORIDE 125 ML/HR: 600; 310; 30; 20 INJECTION, SOLUTION INTRAVENOUS at 15:33

## 2020-06-02 RX ADMIN — ROCURONIUM BROMIDE 10 MG: 10 SOLUTION INTRAVENOUS at 08:43

## 2020-06-02 RX ADMIN — SIMETHICONE CHEW TAB 80 MG 80 MG: 80 TABLET ORAL at 21:07

## 2020-06-02 RX ADMIN — KETOROLAC TROMETHAMINE 30 MG: 30 INJECTION, SOLUTION INTRAMUSCULAR; INTRAVENOUS at 07:37

## 2020-06-02 RX ADMIN — GLYCOPYRROLATE 0.4 MG: 0.2 INJECTION INTRAMUSCULAR; INTRAVENOUS at 09:22

## 2020-06-02 RX ADMIN — PROPOFOL 100 MG: 10 INJECTION, EMULSION INTRAVENOUS at 07:42

## 2020-06-02 RX ADMIN — IBUPROFEN 800 MG: 800 TABLET, FILM COATED ORAL at 21:57

## 2020-06-02 RX ADMIN — SODIUM CHLORIDE, POTASSIUM CHLORIDE, SODIUM LACTATE AND CALCIUM CHLORIDE 125 ML/HR: 600; 310; 30; 20 INJECTION, SOLUTION INTRAVENOUS at 07:02

## 2020-06-02 RX ADMIN — SODIUM CHLORIDE, POTASSIUM CHLORIDE, SODIUM LACTATE AND CALCIUM CHLORIDE: 600; 310; 30; 20 INJECTION, SOLUTION INTRAVENOUS at 07:37

## 2020-06-02 RX ADMIN — ROCURONIUM BROMIDE 10 MG: 10 SOLUTION INTRAVENOUS at 08:18

## 2020-06-02 RX ADMIN — SODIUM CHLORIDE, POTASSIUM CHLORIDE, SODIUM LACTATE AND CALCIUM CHLORIDE 125 ML/HR: 600; 310; 30; 20 INJECTION, SOLUTION INTRAVENOUS at 21:57

## 2020-06-02 RX ADMIN — CEFOXITIN SODIUM 2 G: 2 POWDER, FOR SOLUTION INTRAVENOUS at 07:45

## 2020-06-02 RX ADMIN — NEOSTIGMINE METHYLSULFATE 2 MG: 1 INJECTION, SOLUTION INTRAVENOUS at 09:22

## 2020-06-02 RX ADMIN — FENTANYL CITRATE 50 MCG: 50 INJECTION INTRAMUSCULAR; INTRAVENOUS at 08:18

## 2020-06-02 RX ADMIN — NICOTINE 1 PATCH: 21 PATCH TRANSDERMAL at 15:32

## 2020-06-02 RX ADMIN — OXYCODONE HYDROCHLORIDE AND ACETAMINOPHEN 1 TABLET: 5; 325 TABLET ORAL at 17:09

## 2020-06-02 RX ADMIN — HYDROMORPHONE HYDROCHLORIDE 2 MG: 2 INJECTION, SOLUTION INTRAMUSCULAR; INTRAVENOUS; SUBCUTANEOUS at 07:37

## 2020-06-02 RX ADMIN — ROCURONIUM BROMIDE 30 MG: 10 SOLUTION INTRAVENOUS at 07:42

## 2020-06-02 RX ADMIN — ONDANSETRON 4 MG: 2 INJECTION INTRAMUSCULAR; INTRAVENOUS at 07:42

## 2020-06-02 RX ADMIN — OXYCODONE HYDROCHLORIDE AND ACETAMINOPHEN 1 TABLET: 5; 325 TABLET ORAL at 21:07

## 2020-06-02 NOTE — PLAN OF CARE
Problem: Patient Care Overview  Goal: Plan of Care Review  Outcome: Ongoing (interventions implemented as appropriate)  Flowsheets (Taken 6/2/2020 1810)  Progress: improving  Plan of Care Reviewed With: patient  Goal: Individualization and Mutuality  Outcome: Ongoing (interventions implemented as appropriate)  Goal: Discharge Needs Assessment  Outcome: Ongoing (interventions implemented as appropriate)  Goal: Interprofessional Rounds/Family Conf  Outcome: Ongoing (interventions implemented as appropriate)     Problem: Hysterectomy (Adult)  Goal: Signs and Symptoms of Listed Potential Problems Will be Absent, Minimized or Managed (Hysterectomy)  Outcome: Ongoing (interventions implemented as appropriate)  Flowsheets (Taken 6/2/2020 1810)  Problems Assessed (Hysterectomy): all  Problems Present (Hysterectomy): none  Note:   Patient is doing well, firm, small lochia, prn meds for meds.   Goal: Anesthesia/Sedation Recovery  Outcome: Ongoing (interventions implemented as appropriate)

## 2020-06-02 NOTE — ANESTHESIA POSTPROCEDURE EVALUATION
Patient: Vicki Dietrich    Procedure Summary     Date:  06/02/20 Room / Location:  [facility] OR  /  [facility] OR    Anesthesia Start:  0739 Anesthesia Stop:  0936    Procedure:  TOTAL LAPAROSCOPIC HYSTERECTOMY BILATERAL SALPINGECTOMY OOPHORECTOMY WITH DAVINCI ROBOT, CYSTOSCOPY (N/A Abdomen) Diagnosis:  (N85.01)    Surgeon:  Myrna Rojas DO Provider:  Kel Hester DO    Anesthesia Type:  general ASA Status:  2          Anesthesia Type: general    Vitals  Vitals Value Taken Time   /63 6/2/2020 10:07 AM   Temp 97.6 °F (36.4 °C) 6/2/2020 10:07 AM   Pulse 77 6/2/2020 10:07 AM   Resp 14 6/2/2020 10:07 AM   SpO2 95 % 6/2/2020 10:07 AM           Post Anesthesia Care and Evaluation    Patient location during evaluation: PHASE II  Patient participation: complete - patient participated  Level of consciousness: awake and alert  Pain score: 1  Pain management: adequate  Airway patency: patent  Anesthetic complications: No anesthetic complications  PONV Status: controlled  Cardiovascular status: acceptable  Respiratory status: acceptable  Hydration status: acceptable

## 2020-06-02 NOTE — ANESTHESIA PREPROCEDURE EVALUATION
Anesthesia Evaluation     Patient summary reviewed and Nursing notes reviewed   no history of anesthetic complications:  NPO Solid Status: > 8 hours  NPO Liquid Status: > 8 hours           Airway   Mallampati: II  TM distance: >3 FB  Neck ROM: full  No difficulty expected  Dental - normal exam   (+) edentulous    Pulmonary - normal exam    breath sounds clear to auscultation  (+) a smoker Current Smoked day of surgery, asthma,  Cardiovascular - normal exam  Exercise tolerance: good (4-7 METS)    NYHA Classification: II  Rhythm: regular  Rate: normal    (+) past MI ,       Neuro/Psych  (+) numbness,     GI/Hepatic/Renal/Endo    (+) obesity,       Musculoskeletal     Abdominal   (+) obese,    Substance History - negative use     OB/GYN negative ob/gyn ROS         Other   arthritis,    history of cancer active                    Anesthesia Plan    ASA 2     general     intravenous induction     Anesthetic plan, all risks, benefits, and alternatives have been provided, discussed and informed consent has been obtained with: patient.    Plan discussed with CRNA.

## 2020-06-02 NOTE — OP NOTE
DaVinci Robotic Total Laparoscopy Hysterectomy, Cystoscopy    Vicki Dietrich  6/2/2020    Pre-op Diagnosis:   N85.01    Post-op Diagnosis:     FREDDY    Procedure(s):  TOTAL LAPAROSCOPIC HYSTERECTOMY BILATERAL SALPINGECTOMY OOPHORECTOMY WITH DAVINCI ROBOT, CYSTOSCOPY     Surgeon(s):  Myrna Rojas DO    Surgeon:  Myrna Rojas DO    Findings:   Large fibroid uterus.  Hemorrhagic cyst b/l ovaries.  Normal appendix.  Normal bladder with no lesions,  Masses or polyps.  Ureters patent with jetting of fluorescin dye bilateral    Pathology:   Uterus, tubes and ovaries    Anesthesia: General    Staff:   Circulator: Angie Dinero RN  Relief Circulator: Dale Castillo RN  Scrub Person: Michelle Smith  Assistant: Tabatha Huang CSA      Estimated Blood Loss: minimal    Grafts/Implants: None    Procedure           The patient was prepped and draped in normal sterile fashion in the dorsal lithotomy position.  A speculum was placed in the vagina.  The cervix was visualized and the anterior lip was grasped with a single tooth tenaculum.  The uterus sounded to 7 cm. A stay suture was placed in the 3:00 and 9:00 positions of the cervix and the tenaculum removed. The Kayley Tiffany manipulator was inserted according to protocol. A rosen catheter was then inserted.  The speculum was removed and attention was turned to the abdominal portion of this procedure.    Quarter percent marcaine was injected superior to the umbilicus.  A 8 mm  incision was made with the scalpel and an 8 mm trocar was inserted under direct visualization.  The abdomen was insufflated and the anatomy was surveyed and found to be without trauma.  The patient was placed in Trendelenburg position.  The uterus, tubes and ovaries with the findings as noted above.  3 additional 8 mm incisions and trocars were placed approximately 8 cm lateral to the camera port under direct visualization.  An additional 5 mm skin incision was made between the right  lateral port and camera port.  A 5 mm trocar was inserted under direct visualization.  The da Aman robot was then docked and connected to all ports.  The uterus was anteverted.  The left tube and ovary were grasped and the vessel sealer was used to grasp, cauterize and transect the IP ligament .  The Round ligament was transected bilaterally with the same device. The same procedure was performed on the contralateral side.  Monopolar scissors was used to create the bladder flap and this was bluntly dissected from the DIANE.  The same procedure was performed on the contralateral side.  The anterior colpotomy was then made.  The remainder of the cardinal ligament and uterine artery was cauterized and transected bilaterally.  The colpotomy was then completed and the uterus was pulled into the vagina to maintain pneumoperitoneum.  All was noted to be hemostatic.  The cuff was closed in a running stitch with a V lock suture.  All was noted to be hemostatic.  All instruments were removed from the abdomen under direct visualization.  The da Aman robot was then undocked according to protocol.  All trochars were removed and incisions were closed with a 3-0 Monocryl stitch and Dermabond.  Attention was then turned to the cystoscopy portion of this procedure.  The rosen catheter was removed and a 70 degree cystoscope was inserted through the urethral meatus into the bladder.  No lesions, masses, polyps nor stitches were noted.  There ureters were found to jet fluorescein dye bilaterally.  The cystoscope was removed and the rosen catheter re-inserted.   All sponge, lap and needle counts were correct and the patient was taken to the recovery room in stable condition.       Complications: None  Myrna Rojas DO     Date: 6/2/2020  Time: 09:50

## 2020-06-02 NOTE — ANESTHESIA PROCEDURE NOTES
Airway  Urgency: elective    Date/Time: 6/2/2020 7:43 AM  Airway not difficult    General Information and Staff    Patient location during procedure: OR  Anesthesiologist: Kel Hester DO  CRNA: Isabella Abbott CRNA    Indications and Patient Condition  Indications for airway management: airway protection    Preoxygenated: yes  MILS maintained throughout  Mask difficulty assessment: 1 - vent by mask    Final Airway Details  Final airway type: endotracheal airway      Successful airway: ETT  Cuffed: yes   Successful intubation technique: direct laryngoscopy  Endotracheal tube insertion site: oral  Blade: Sofia  Blade size: 2  ETT size (mm): 7.5  Cormack-Lehane Classification: grade I - full view of glottis  Placement verified by: chest auscultation   Measured from: lips  ETT/EBT  to lips (cm): 21  Number of attempts at approach: 1  Assessment: lips, teeth, and gum same as pre-op and atraumatic intubation

## 2020-06-03 ENCOUNTER — READMISSION MANAGEMENT (OUTPATIENT)
Dept: CALL CENTER | Facility: HOSPITAL | Age: 45
End: 2020-06-03

## 2020-06-03 VITALS
HEART RATE: 85 BPM | OXYGEN SATURATION: 98 % | BODY MASS INDEX: 29.77 KG/M2 | RESPIRATION RATE: 18 BRPM | WEIGHT: 185.25 LBS | TEMPERATURE: 97.8 F | HEIGHT: 66 IN | DIASTOLIC BLOOD PRESSURE: 76 MMHG | SYSTOLIC BLOOD PRESSURE: 121 MMHG

## 2020-06-03 PROBLEM — Z41.9 ELECTIVE SURGERY: Status: ACTIVE | Noted: 2020-06-03

## 2020-06-03 LAB
BASOPHILS # BLD AUTO: 0.03 10*3/MM3 (ref 0–0.2)
BASOPHILS NFR BLD AUTO: 0.3 % (ref 0–1.5)
DEPRECATED RDW RBC AUTO: 40.9 FL (ref 37–54)
EOSINOPHIL # BLD AUTO: 0.15 10*3/MM3 (ref 0–0.4)
EOSINOPHIL NFR BLD AUTO: 1.6 % (ref 0.3–6.2)
ERYTHROCYTE [DISTWIDTH] IN BLOOD BY AUTOMATED COUNT: 12.5 % (ref 12.3–15.4)
HCT VFR BLD AUTO: 38.4 % (ref 34–46.6)
HGB BLD-MCNC: 12.8 G/DL (ref 12–15.9)
IMM GRANULOCYTES # BLD AUTO: 0.02 10*3/MM3 (ref 0–0.05)
IMM GRANULOCYTES NFR BLD AUTO: 0.2 % (ref 0–0.5)
LYMPHOCYTES # BLD AUTO: 2.06 10*3/MM3 (ref 0.7–3.1)
LYMPHOCYTES NFR BLD AUTO: 22.5 % (ref 19.6–45.3)
MCH RBC QN AUTO: 30.1 PG (ref 26.6–33)
MCHC RBC AUTO-ENTMCNC: 33.3 G/DL (ref 31.5–35.7)
MCV RBC AUTO: 90.4 FL (ref 79–97)
MONOCYTES # BLD AUTO: 0.63 10*3/MM3 (ref 0.1–0.9)
MONOCYTES NFR BLD AUTO: 6.9 % (ref 5–12)
NEUTROPHILS # BLD AUTO: 6.28 10*3/MM3 (ref 1.7–7)
NEUTROPHILS NFR BLD AUTO: 68.5 % (ref 42.7–76)
NRBC BLD AUTO-RTO: 0 /100 WBC (ref 0–0.2)
PLATELET # BLD AUTO: 254 10*3/MM3 (ref 140–450)
PMV BLD AUTO: 10.4 FL (ref 6–12)
RBC # BLD AUTO: 4.25 10*6/MM3 (ref 3.77–5.28)
WBC NRBC COR # BLD: 9.17 10*3/MM3 (ref 3.4–10.8)

## 2020-06-03 PROCEDURE — 94640 AIRWAY INHALATION TREATMENT: CPT

## 2020-06-03 PROCEDURE — 25010000002 ONDANSETRON PER 1 MG: Performed by: OBSTETRICS & GYNECOLOGY

## 2020-06-03 PROCEDURE — 85025 COMPLETE CBC W/AUTO DIFF WBC: CPT | Performed by: OBSTETRICS & GYNECOLOGY

## 2020-06-03 PROCEDURE — 94799 UNLISTED PULMONARY SVC/PX: CPT

## 2020-06-03 PROCEDURE — G0378 HOSPITAL OBSERVATION PER HR: HCPCS

## 2020-06-03 RX ORDER — OXYCODONE HYDROCHLORIDE AND ACETAMINOPHEN 5; 325 MG/1; MG/1
1 TABLET ORAL EVERY 6 HOURS PRN
Qty: 30 TABLET | Refills: 0 | Status: SHIPPED | OUTPATIENT
Start: 2020-06-03 | End: 2020-06-12

## 2020-06-03 RX ORDER — NICOTINE 21 MG/24HR
1 PATCH, TRANSDERMAL 24 HOURS TRANSDERMAL EVERY 24 HOURS
Qty: 14 PATCH | Refills: 0 | Status: SHIPPED | OUTPATIENT
Start: 2020-06-03 | End: 2020-06-17

## 2020-06-03 RX ORDER — IBUPROFEN 800 MG/1
800 TABLET ORAL EVERY 8 HOURS PRN
Qty: 30 TABLET | Refills: 0 | OUTPATIENT
Start: 2020-06-03 | End: 2021-04-17

## 2020-06-03 RX ORDER — IPRATROPIUM BROMIDE AND ALBUTEROL SULFATE 2.5; .5 MG/3ML; MG/3ML
3 SOLUTION RESPIRATORY (INHALATION) EVERY 4 HOURS PRN
Status: DISCONTINUED | OUTPATIENT
Start: 2020-06-03 | End: 2020-06-03 | Stop reason: HOSPADM

## 2020-06-03 RX ORDER — POLYETHYLENE GLYCOL 3350 17 G/17G
17 POWDER, FOR SOLUTION ORAL DAILY
Qty: 10 PACKET | Refills: 0 | Status: SHIPPED | OUTPATIENT
Start: 2020-06-03 | End: 2020-06-13

## 2020-06-03 RX ORDER — ALBUTEROL SULFATE 90 UG/1
2 AEROSOL, METERED RESPIRATORY (INHALATION) EVERY 6 HOURS PRN
Qty: 8.5 G | Refills: 1 | Status: SHIPPED | OUTPATIENT
Start: 2020-06-03 | End: 2022-09-12

## 2020-06-03 RX ORDER — GUAIFENESIN 600 MG/1
600 TABLET, EXTENDED RELEASE ORAL ONCE
Status: COMPLETED | OUTPATIENT
Start: 2020-06-03 | End: 2020-06-03

## 2020-06-03 RX ORDER — ALBUTEROL SULFATE 90 UG/1
2 AEROSOL, METERED RESPIRATORY (INHALATION) EVERY 6 HOURS PRN
Status: DISCONTINUED | OUTPATIENT
Start: 2020-06-03 | End: 2020-06-03 | Stop reason: HOSPADM

## 2020-06-03 RX ADMIN — GUAIFENESIN 600 MG: 600 TABLET, EXTENDED RELEASE ORAL at 05:23

## 2020-06-03 RX ADMIN — OXYCODONE HYDROCHLORIDE AND ACETAMINOPHEN 1 TABLET: 5; 325 TABLET ORAL at 01:21

## 2020-06-03 RX ADMIN — NICOTINE 1 PATCH: 21 PATCH TRANSDERMAL at 09:13

## 2020-06-03 RX ADMIN — ALBUTEROL SULFATE 2 PUFF: 90 AEROSOL, METERED RESPIRATORY (INHALATION) at 05:23

## 2020-06-03 RX ADMIN — IBUPROFEN 800 MG: 800 TABLET, FILM COATED ORAL at 09:12

## 2020-06-03 RX ADMIN — OXYCODONE HYDROCHLORIDE AND ACETAMINOPHEN 1 TABLET: 5; 325 TABLET ORAL at 06:06

## 2020-06-03 RX ADMIN — SIMETHICONE CHEW TAB 80 MG 80 MG: 80 TABLET ORAL at 06:06

## 2020-06-03 RX ADMIN — ONDANSETRON 4 MG: 2 INJECTION INTRAMUSCULAR; INTRAVENOUS at 01:21

## 2020-06-03 RX ADMIN — IPRATROPIUM BROMIDE AND ALBUTEROL SULFATE 3 ML: .5; 3 SOLUTION RESPIRATORY (INHALATION) at 04:56

## 2020-06-03 NOTE — DISCHARGE SUMMARY
Discharge Summary    Admit Date: 6/2/2020  5:50 AM    Admit Diagnosis: Simple endometrial hyperplasia without atypia [N85.01]    Date of Discharge:  6/3/2020    Discharge Diagnosis: Same        Hospital Course  Patient is a 44 y.o. female admitted secondary to Robotic TLH/BSO, cystoscopy. Patient doingwell. Patient tolerating a regular diet and ambulating without difficulty. Will discharge to home today.         Vital Signs  Temp:  [96.8 °F (36 °C)-98.8 °F (37.1 °C)] 98.3 °F (36.8 °C)  Heart Rate:  [69-94] 89  Resp:  [14-20] 20  BP: ()/(60-76) 106/63    Review of Systems    The following systems were reviewed and negative;  ENT, respiratory, cardiovascular, gastrointestinal, genitourinary, breast, endocrine and allergies / immunologic.    Physical Examination: General appearance - alert, well appearing, and in no distress  Chest - CTAB, no w/r/r  Heart - normal rate and regular rhythm  Abdomen - Soft, ND, NTTP, Incisions c/d/i  Extremities - no pedal edema noted    Condition on Discharge:  Stable    Urine Output Good    Discharge Diet: Regular  Discharge medications: Percocet, Ibuprofen, nicoderm, miralax    Follow-up Appointments  Patient will follow up in clinic this week.        Myrna Rojas DO  06/03/20  07:56

## 2020-06-03 NOTE — OUTREACH NOTE
Prep Survey      Responses   [facility], [place], operated by [facility] patient discharged from?  [facility]   Is LACE score < 7 ?  Yes   Eligibility  [facility]   Date of Admission  06/02/20   Date of Discharge  06/03/20   Discharge Disposition  Home or Self Care   Discharge diagnosis  Simple endometrial hyperplasia without atypia,   Robotic TLH/BSO, cystoscopy   COVID-19 Test Status  Negative   Does the patient have one of the following disease processes/diagnoses(primary or secondary)?  Other   Does the patient have Home health ordered?  No   Is there a DME ordered?  No   Prep survey completed?  Yes          Jessica García RN

## 2020-06-03 NOTE — DISCHARGE INSTR - APPOINTMENTS
Please keep your scheduled appointment with Dr. Rojas.  You can reach Dr. Rojas at [phone].

## 2020-06-03 NOTE — PLAN OF CARE
Problem: Patient Care Overview  Goal: Plan of Care Review  Outcome: Ongoing (interventions implemented as appropriate)  Flowsheets  Taken 6/2/2020 1810 by Cinthia Miramontes, RN  Progress: improving  Taken 6/2/2020 2000 by Dorothy Becerra RN  Plan of Care Reviewed With: patient  Goal: Discharge Needs Assessment  Outcome: Ongoing (interventions implemented as appropriate)  Flowsheets  Taken 6/3/2020 0344 by Dorothy Becerra RN  Equipment Needed After Discharge: none  Anticipated Changes Related to Illness: none  Transportation Concerns: car, none  Concerns to be Addressed: no discharge needs identified  Readmission Within the Last 30 Days: no previous admission in last 30 days  Patient/Family Anticipated Services at Transition: none  Taken 6/2/2020 1117 by Leann Stark RN  Transportation Anticipated: family or friend will provide  Patient/Family Anticipates Transition to: home with family     Problem: Hysterectomy (Adult)  Goal: Signs and Symptoms of Listed Potential Problems Will be Absent, Minimized or Managed (Hysterectomy)  Outcome: Ongoing (interventions implemented as appropriate)  Flowsheets (Taken 6/2/2020 1810 by Cinthia Miramontes, RN)  Problems Assessed (Hysterectomy): all  Problems Present (Hysterectomy): none  Note:   Patient's vitals wnl, no bleeding, pain controlled with prn meds

## 2020-06-04 ENCOUNTER — TRANSITIONAL CARE MANAGEMENT TELEPHONE ENCOUNTER (OUTPATIENT)
Dept: CALL CENTER | Facility: HOSPITAL | Age: 45
End: 2020-06-04

## 2020-06-04 NOTE — OUTREACH NOTE
Call Center TCM Note      Responses   [facility], [place], operated by [facility] patient discharged from?  [facility]   COVID-19 Test Status  Negative   Does the patient have one of the following disease processes/diagnoses(primary or secondary)?  Other   TCM attempt successful?  No   Unsuccessful attempts  Attempt 2          Ale Reed RN    6/4/2020, 16:21

## 2020-06-04 NOTE — OUTREACH NOTE
Call Center TCM Note      Responses   [facility] patient discharged from?  [place]   COVID-19 Test Status  Negative   Does the patient have one of the following disease processes/diagnoses(primary or secondary)?  Other   TCM attempt successful?  No   Unsuccessful attempts  Attempt 1          Ale Reed RN    6/4/2020, 14:30

## 2020-06-05 ENCOUNTER — TRANSITIONAL CARE MANAGEMENT TELEPHONE ENCOUNTER (OUTPATIENT)
Dept: CALL CENTER | Facility: HOSPITAL | Age: 45
End: 2020-06-05

## 2020-06-05 LAB
LAB AP CASE REPORT: NORMAL
PATH REPORT.FINAL DX SPEC: NORMAL

## 2020-06-05 NOTE — OUTREACH NOTE
Call Center TCM Note      Responses   [facility] patient discharged from?  [facility]   COVID-19 Test Status  Negative   Does the patient have one of the following disease processes/diagnoses(primary or secondary)?  Other   TCM attempt successful?  No   Unsuccessful attempts  Attempt 3          Nata Mayen RN    6/5/2020, 10:23

## 2021-04-17 ENCOUNTER — APPOINTMENT (OUTPATIENT)
Dept: CT IMAGING | Facility: HOSPITAL | Age: 46
End: 2021-04-17

## 2021-04-17 ENCOUNTER — HOSPITAL ENCOUNTER (EMERGENCY)
Facility: HOSPITAL | Age: 46
Discharge: HOME | End: 2021-04-17
Attending: EMERGENCY MEDICINE | Admitting: EMERGENCY MEDICINE

## 2021-04-17 ENCOUNTER — APPOINTMENT (OUTPATIENT)
Dept: GENERAL RADIOLOGY | Facility: HOSPITAL | Age: 46
End: 2021-04-17

## 2021-04-17 VITALS
DIASTOLIC BLOOD PRESSURE: 77 MMHG | TEMPERATURE: 97.8 F | RESPIRATION RATE: 18 BRPM | HEART RATE: 103 BPM | SYSTOLIC BLOOD PRESSURE: 111 MMHG | HEIGHT: 66 IN | BODY MASS INDEX: 24.11 KG/M2 | WEIGHT: 150 LBS | OXYGEN SATURATION: 96 %

## 2021-04-17 DIAGNOSIS — R10.13 EPIGASTRIC PAIN: ICD-10-CM

## 2021-04-17 DIAGNOSIS — J18.9 ATYPICAL PNEUMONIA: Primary | ICD-10-CM

## 2021-04-17 DIAGNOSIS — K59.00 CONSTIPATION, UNSPECIFIED CONSTIPATION TYPE: ICD-10-CM

## 2021-04-17 LAB
ALBUMIN SERPL-MCNC: 4.06 G/DL (ref 3.5–5.2)
ALBUMIN/GLOB SERPL: 1.3 G/DL
ALP SERPL-CCNC: 110 U/L (ref 39–117)
ALT SERPL W P-5'-P-CCNC: 75 U/L (ref 1–33)
AMYLASE SERPL-CCNC: 91 U/L (ref 28–100)
ANION GAP SERPL CALCULATED.3IONS-SCNC: 12.5 MMOL/L (ref 5–15)
AST SERPL-CCNC: 70 U/L (ref 1–32)
BACTERIA UR QL AUTO: ABNORMAL /HPF
BASOPHILS # BLD AUTO: 0.07 10*3/MM3 (ref 0–0.2)
BASOPHILS NFR BLD AUTO: 0.8 % (ref 0–1.5)
BILIRUB SERPL-MCNC: 0.2 MG/DL (ref 0–1.2)
BILIRUB UR QL STRIP: NEGATIVE
BUN SERPL-MCNC: 11 MG/DL (ref 6–20)
BUN/CREAT SERPL: 12.8 (ref 7–25)
CALCIUM SPEC-SCNC: 10.2 MG/DL (ref 8.6–10.5)
CHLORIDE SERPL-SCNC: 105 MMOL/L (ref 98–107)
CLARITY UR: CLEAR
CO2 SERPL-SCNC: 22.5 MMOL/L (ref 22–29)
COLOR UR: YELLOW
CREAT SERPL-MCNC: 0.86 MG/DL (ref 0.57–1)
D DIMER PPP FEU-MCNC: 0.34 MCGFEU/ML (ref 0–0.5)
DEPRECATED RDW RBC AUTO: 41.2 FL (ref 37–54)
EOSINOPHIL # BLD AUTO: 0.27 10*3/MM3 (ref 0–0.4)
EOSINOPHIL NFR BLD AUTO: 3 % (ref 0.3–6.2)
ERYTHROCYTE [DISTWIDTH] IN BLOOD BY AUTOMATED COUNT: 11.9 % (ref 12.3–15.4)
FLUAV RNA RESP QL NAA+PROBE: NOT DETECTED
FLUBV RNA RESP QL NAA+PROBE: NOT DETECTED
GFR SERPL CREATININE-BSD FRML MDRD: 72 ML/MIN/1.73
GLOBULIN UR ELPH-MCNC: 3.1 GM/DL
GLUCOSE SERPL-MCNC: 129 MG/DL (ref 65–99)
GLUCOSE UR STRIP-MCNC: NEGATIVE MG/DL
HCT VFR BLD AUTO: 47.3 % (ref 34–46.6)
HGB BLD-MCNC: 15.4 G/DL (ref 12–15.9)
HGB UR QL STRIP.AUTO: ABNORMAL
HOLD SPECIMEN: NORMAL
HOLD SPECIMEN: NORMAL
HYALINE CASTS UR QL AUTO: ABNORMAL /LPF
IMM GRANULOCYTES # BLD AUTO: 0.02 10*3/MM3 (ref 0–0.05)
IMM GRANULOCYTES NFR BLD AUTO: 0.2 % (ref 0–0.5)
KETONES UR QL STRIP: NEGATIVE
LEUKOCYTE ESTERASE UR QL STRIP.AUTO: NEGATIVE
LIPASE SERPL-CCNC: 97 U/L (ref 13–60)
LYMPHOCYTES # BLD AUTO: 3.28 10*3/MM3 (ref 0.7–3.1)
LYMPHOCYTES NFR BLD AUTO: 36 % (ref 19.6–45.3)
MCH RBC QN AUTO: 30.3 PG (ref 26.6–33)
MCHC RBC AUTO-ENTMCNC: 32.6 G/DL (ref 31.5–35.7)
MCV RBC AUTO: 93.1 FL (ref 79–97)
MONOCYTES # BLD AUTO: 0.64 10*3/MM3 (ref 0.1–0.9)
MONOCYTES NFR BLD AUTO: 7 % (ref 5–12)
NEUTROPHILS NFR BLD AUTO: 4.82 10*3/MM3 (ref 1.7–7)
NEUTROPHILS NFR BLD AUTO: 53 % (ref 42.7–76)
NITRITE UR QL STRIP: NEGATIVE
NRBC BLD AUTO-RTO: 0 /100 WBC (ref 0–0.2)
NT-PROBNP SERPL-MCNC: 35.4 PG/ML (ref 0–450)
PH UR STRIP.AUTO: <=5 [PH] (ref 5–8)
PLATELET # BLD AUTO: 257 10*3/MM3 (ref 140–450)
PMV BLD AUTO: 10.5 FL (ref 6–12)
POTASSIUM SERPL-SCNC: 4.4 MMOL/L (ref 3.5–5.2)
PROT SERPL-MCNC: 7.2 G/DL (ref 6–8.5)
PROT UR QL STRIP: NEGATIVE
RBC # BLD AUTO: 5.08 10*6/MM3 (ref 3.77–5.28)
RBC # UR: ABNORMAL /HPF
REF LAB TEST METHOD: ABNORMAL
SARS-COV-2 RNA RESP QL NAA+PROBE: NOT DETECTED
SODIUM SERPL-SCNC: 140 MMOL/L (ref 136–145)
SP GR UR STRIP: 1.01 (ref 1–1.03)
SQUAMOUS #/AREA URNS HPF: ABNORMAL /HPF
TROPONIN T SERPL-MCNC: <0.01 NG/ML (ref 0–0.03)
UROBILINOGEN UR QL STRIP: ABNORMAL
WBC # BLD AUTO: 9.1 10*3/MM3 (ref 3.4–10.8)
WBC UR QL AUTO: ABNORMAL /HPF
WHOLE BLOOD HOLD SPECIMEN: NORMAL
WHOLE BLOOD HOLD SPECIMEN: NORMAL

## 2021-04-17 PROCEDURE — 71275 CT ANGIOGRAPHY CHEST: CPT

## 2021-04-17 PROCEDURE — 73562 X-RAY EXAM OF KNEE 3: CPT | Performed by: RADIOLOGY

## 2021-04-17 PROCEDURE — 83880 ASSAY OF NATRIURETIC PEPTIDE: CPT | Performed by: EMERGENCY MEDICINE

## 2021-04-17 PROCEDURE — 83690 ASSAY OF LIPASE: CPT | Performed by: EMERGENCY MEDICINE

## 2021-04-17 PROCEDURE — 82150 ASSAY OF AMYLASE: CPT | Performed by: EMERGENCY MEDICINE

## 2021-04-17 PROCEDURE — 84484 ASSAY OF TROPONIN QUANT: CPT | Performed by: EMERGENCY MEDICINE

## 2021-04-17 PROCEDURE — 93010 ELECTROCARDIOGRAM REPORT: CPT | Performed by: INTERNAL MEDICINE

## 2021-04-17 PROCEDURE — 74177 CT ABD & PELVIS W/CONTRAST: CPT

## 2021-04-17 PROCEDURE — 93005 ELECTROCARDIOGRAM TRACING: CPT | Performed by: EMERGENCY MEDICINE

## 2021-04-17 PROCEDURE — 96375 TX/PRO/DX INJ NEW DRUG ADDON: CPT

## 2021-04-17 PROCEDURE — 71045 X-RAY EXAM CHEST 1 VIEW: CPT | Performed by: RADIOLOGY

## 2021-04-17 PROCEDURE — 85025 COMPLETE CBC W/AUTO DIFF WBC: CPT | Performed by: EMERGENCY MEDICINE

## 2021-04-17 PROCEDURE — 73562 X-RAY EXAM OF KNEE 3: CPT

## 2021-04-17 PROCEDURE — 81001 URINALYSIS AUTO W/SCOPE: CPT | Performed by: EMERGENCY MEDICINE

## 2021-04-17 PROCEDURE — 0 IOPAMIDOL PER 1 ML: Performed by: EMERGENCY MEDICINE

## 2021-04-17 PROCEDURE — 80053 COMPREHEN METABOLIC PANEL: CPT | Performed by: EMERGENCY MEDICINE

## 2021-04-17 PROCEDURE — 85379 FIBRIN DEGRADATION QUANT: CPT | Performed by: EMERGENCY MEDICINE

## 2021-04-17 PROCEDURE — 71275 CT ANGIOGRAPHY CHEST: CPT | Performed by: RADIOLOGY

## 2021-04-17 PROCEDURE — 71045 X-RAY EXAM CHEST 1 VIEW: CPT

## 2021-04-17 PROCEDURE — 25010000002 KETOROLAC TROMETHAMINE PER 15 MG: Performed by: EMERGENCY MEDICINE

## 2021-04-17 PROCEDURE — 99284 EMERGENCY DEPT VISIT MOD MDM: CPT

## 2021-04-17 PROCEDURE — 87636 SARSCOV2 & INF A&B AMP PRB: CPT | Performed by: EMERGENCY MEDICINE

## 2021-04-17 PROCEDURE — 96374 THER/PROPH/DIAG INJ IV PUSH: CPT

## 2021-04-17 PROCEDURE — 74177 CT ABD & PELVIS W/CONTRAST: CPT | Performed by: RADIOLOGY

## 2021-04-17 RX ORDER — LIDOCAINE HYDROCHLORIDE 20 MG/ML
15 SOLUTION OROPHARYNGEAL ONCE
Status: COMPLETED | OUTPATIENT
Start: 2021-04-17 | End: 2021-04-17

## 2021-04-17 RX ORDER — POLYETHYLENE GLYCOL 3350 17 G/17G
17 POWDER, FOR SOLUTION ORAL DAILY
Qty: 5 EACH | Refills: 0 | Status: SHIPPED | OUTPATIENT
Start: 2021-04-17 | End: 2021-04-22

## 2021-04-17 RX ORDER — SODIUM CHLORIDE 9 MG/ML
125 INJECTION, SOLUTION INTRAVENOUS CONTINUOUS
Status: DISCONTINUED | OUTPATIENT
Start: 2021-04-17 | End: 2021-04-17 | Stop reason: HOSPADM

## 2021-04-17 RX ORDER — ALUMINA, MAGNESIA, AND SIMETHICONE 2400; 2400; 240 MG/30ML; MG/30ML; MG/30ML
15 SUSPENSION ORAL ONCE
Status: COMPLETED | OUTPATIENT
Start: 2021-04-17 | End: 2021-04-17

## 2021-04-17 RX ORDER — DOXYCYCLINE 100 MG/1
100 CAPSULE ORAL EVERY 12 HOURS
Qty: 20 CAPSULE | Refills: 0 | OUTPATIENT
Start: 2021-04-17 | End: 2022-01-11

## 2021-04-17 RX ORDER — KETOROLAC TROMETHAMINE 30 MG/ML
15 INJECTION, SOLUTION INTRAMUSCULAR; INTRAVENOUS ONCE
Status: COMPLETED | OUTPATIENT
Start: 2021-04-17 | End: 2021-04-17

## 2021-04-17 RX ORDER — SODIUM CHLORIDE 0.9 % (FLUSH) 0.9 %
10 SYRINGE (ML) INJECTION AS NEEDED
Status: DISCONTINUED | OUTPATIENT
Start: 2021-04-17 | End: 2021-04-17 | Stop reason: HOSPADM

## 2021-04-17 RX ORDER — PANTOPRAZOLE SODIUM 40 MG/1
40 TABLET, DELAYED RELEASE ORAL DAILY
Qty: 30 TABLET | Refills: 0 | Status: SHIPPED | OUTPATIENT
Start: 2021-04-17 | End: 2022-09-12

## 2021-04-17 RX ORDER — PANTOPRAZOLE SODIUM 40 MG/10ML
40 INJECTION, POWDER, LYOPHILIZED, FOR SOLUTION INTRAVENOUS ONCE
Status: COMPLETED | OUTPATIENT
Start: 2021-04-17 | End: 2021-04-17

## 2021-04-17 RX ORDER — PHENOBARBITAL, HYOSCYAMINE SULFATE, ATROPINE SULFATE AND SCOPOLAMINE HYDROBROMIDE .0194; .1037; 16.2; .0065 MG/1; MG/1; MG/1; MG/1
2 TABLET ORAL ONCE
Status: COMPLETED | OUTPATIENT
Start: 2021-04-17 | End: 2021-04-17

## 2021-04-17 RX ADMIN — KETOROLAC TROMETHAMINE 15 MG: 30 INJECTION, SOLUTION INTRAMUSCULAR at 09:56

## 2021-04-17 RX ADMIN — PANTOPRAZOLE SODIUM 40 MG: 40 INJECTION, POWDER, FOR SOLUTION INTRAVENOUS at 09:57

## 2021-04-17 RX ADMIN — ALUMINUM HYDROXIDE, MAGNESIUM HYDROXIDE, AND DIMETHICONE 15 ML: 400; 400; 40 SUSPENSION ORAL at 10:01

## 2021-04-17 RX ADMIN — LIDOCAINE HYDROCHLORIDE 15 ML: 20 SOLUTION ORAL; TOPICAL at 10:00

## 2021-04-17 RX ADMIN — SODIUM CHLORIDE 1000 ML: 9 INJECTION, SOLUTION INTRAVENOUS at 09:55

## 2021-04-17 RX ADMIN — PHENOBARBITAL, HYOSCYAMINE SULFATE, ATROPINE SULFATE, SCOPOLAMINE HYDROBROMIDE 32.4 MG: 16.2; .1037; .0194; .0065 TABLET ORAL at 10:01

## 2021-04-17 RX ADMIN — SODIUM CHLORIDE 125 ML/HR: 9 INJECTION, SOLUTION INTRAVENOUS at 09:55

## 2021-04-17 RX ADMIN — IOPAMIDOL 85 ML: 755 INJECTION, SOLUTION INTRAVENOUS at 11:36

## 2021-04-17 NOTE — DISCHARGE INSTRUCTIONS
Home care family.  Rest, drink plenty of fluids.  Medications as prescribed.  Do not take ibuprofen or other nonsteroidal anti-inflammatory medications for now.  See Dr. Rose in the office on Monday for recheck.  Return to the emergency department right away if symptoms worsen/any problems.

## 2021-04-17 NOTE — ED PROVIDER NOTES
Subjective   Patient is a 45-year-old female who complains of the onset yesterday evening of upper abdominal pain, epigastric/left upper quadrant.  No nausea, vomiting, diarrhea, hematemesis, hematochezia or melena.  She does admit to a cough that is productive of small amounts of thick green sputum.  She states that she has COPD, states that her cough is not much more than her baseline.  She admits that she continues to smoke.  She also complains of her right knee being sore and painful over the past couple months, primarily at the end of the day when she has spent the day on her feet.  She states that she broke this knee when she was 17 years old, and it intermittently flares up like this.  She has had no recent injury.  No fever, chills, headaches, neck pain, back pain, chest pain, shortness of breath, syncope or near syncope, focal numbness or weakness, other symptoms or other complaints.  She denies any injury.  She tells me that she does not want any narcotic pain medication, only nonsteroidal anti-inflammatory pain medicine.          Review of Systems   Constitutional: Negative for chills, diaphoresis and fever.   HENT: Negative for ear pain, sore throat and trouble swallowing.    Eyes: Negative for photophobia and pain.   Respiratory: Positive for cough. Negative for shortness of breath, wheezing and stridor.    Cardiovascular: Negative for chest pain and palpitations.   Gastrointestinal: Positive for abdominal pain. Negative for abdominal distention, blood in stool, diarrhea, nausea and vomiting.   Endocrine: Negative for polydipsia and polyphagia.   Genitourinary: Negative for difficulty urinating and flank pain.   Musculoskeletal: Negative for back pain, neck pain and neck stiffness.   Skin: Negative for color change and pallor.   Neurological: Negative for seizures, syncope and speech difficulty.   Psychiatric/Behavioral: Negative for confusion.   All other systems reviewed and are negative.      Past  Medical History:   Diagnosis Date   • Abnormal uterine bleeding (AUB)    • Acid reflux    • Arthritis    • Asthma     AS A CHILD   • Back injury    • Cancer (CMS/HCC)     surgery to remove cervical cancer   • Cervical cancer (CMS/HCC)    • Endometriosis        Allergies   Allergen Reactions   • Codeine      States she does not like the way it makes her feel       Past Surgical History:   Procedure Laterality Date   • CERVIX SURGERY     • DILATATION AND CURETTAGE     • TOTAL LAPAROSCOPIC HYSTERECTOMY N/A 6/2/2020    Procedure: TOTAL LAPAROSCOPIC HYSTERECTOMY BILATERAL SALPINGECTOMY OOPHORECTOMY WITH DAVINCI ROBOT, CYSTOSCOPY;  Surgeon: Myrna Rojas DO;  Location: [facility];  Service: [facility];  Laterality: N/A;  Robot docked at 0815, undocked at 0915   • TUBAL ABDOMINAL LIGATION         Family History   Problem Relation Age of Onset   • Cirrhosis Mother    • Diabetes Mother    • Heart failure Mother    • Heart failure Father        Social History     Socioeconomic History   • Marital status:      Spouse name: Not on file   • Number of children: Not on file   • Years of education: Not on file   • Highest education level: Not on file   Tobacco Use   • Smoking status: Current Every Day Smoker     Packs/day: 1.50     Years: 30.00     Pack years: 45.00     Types: Cigarettes   • Smokeless tobacco: Never Used   Substance and Sexual Activity   • Alcohol use: No   • Drug use: No   • Sexual activity: Defer           Objective   Physical Exam  Vitals and nursing note reviewed.   Constitutional:       General: She is not in acute distress.     Appearance: Normal appearance. She is well-developed. She is not ill-appearing, toxic-appearing or diaphoretic.   HENT:      Head: Normocephalic and atraumatic.   Eyes:      General: No scleral icterus.     Pupils: Pupils are equal, round, and reactive to light.   Neck:      Trachea: No tracheal deviation.   Cardiovascular:      Rate and Rhythm: Normal rate and regular rhythm.    Pulmonary:      Effort: Pulmonary effort is normal. No respiratory distress.      Breath sounds: Normal breath sounds.   Chest:      Chest wall: No tenderness.   Abdominal:      General: Bowel sounds are normal.      Palpations: Abdomen is soft.      Tenderness: There is abdominal tenderness (Moderate epigastric tenderness.  Mild left upper quadrant greater than right upper quadrant tenderness.  No other tenderness.  No acute peritoneal signs.). There is no guarding or rebound.   Musculoskeletal:         General: No tenderness. Normal range of motion.      Cervical back: Normal range of motion and neck supple. No rigidity or tenderness.      Right lower leg: No edema.      Left lower leg: No edema.   Skin:     General: Skin is warm and dry.      Capillary Refill: Capillary refill takes less than 2 seconds.      Coloration: Skin is not pale.   Neurological:      General: No focal deficit present.      Mental Status: She is alert and oriented to person, place, and time.      GCS: GCS eye subscore is 4. GCS verbal subscore is 5. GCS motor subscore is 6.      Motor: No abnormal muscle tone.   Psychiatric:         Mood and Affect: Mood normal.         Behavior: Behavior normal.         Procedures  EKG shows sinus rhythm, rate 80.  NV interval 170, QRS duration 84, QTc 417 ms.  No apparent acute ischemia.  No evidence for STEMI.  CT Abdomen Pelvis With Contrast   Final Result       1. No definitive source for the patient's symptoms identified on today's   exam.           2. Other incidental findings as discussed above.           3. moderate to large volume stool                   This report was finalized on 4/17/2021 11:52 AM by Dr. Lio Cody MD.          CT Chest Pulmonary Embolism   Final Result   1 No evidence of a pulmonary embolus.   2. Diffuse groundglass change bilaterally   3. Nodular density in the left breast probably normal tissue but   recommend correlation with mammography       This report was  finalized on 4/17/2021 11:50 AM by Dr. Lio Cody MD.          XR Chest 1 View   Final Result   Increased markings in the right perihilar region       This report was finalized on 4/17/2021 10:33 AM by Dr. Lio Cody MD.          XR Knee 3 View Right   Final Result   No acute bony abnormality        This report was finalized on 4/17/2021 10:33 AM by Dr. Lio Cody MD.            Results for orders placed or performed during the hospital encounter of 04/17/21   COVID-19 and FLU A/B PCR - Swab, Nasopharynx    Specimen: Nasopharynx; Swab   Result Value Ref Range    COVID19 Not Detected Not Detected - Ref. Range    Influenza A PCR Not Detected Not Detected    Influenza B PCR Not Detected Not Detected   Comprehensive Metabolic Panel    Specimen: Arm, Right; Blood   Result Value Ref Range    Glucose 129 (H) 65 - 99 mg/dL    BUN 11 6 - 20 mg/dL    Creatinine 0.86 0.57 - 1.00 mg/dL    Sodium 140 136 - 145 mmol/L    Potassium 4.4 3.5 - 5.2 mmol/L    Chloride 105 98 - 107 mmol/L    CO2 22.5 22.0 - 29.0 mmol/L    Calcium 10.2 8.6 - 10.5 mg/dL    Total Protein 7.2 6.0 - 8.5 g/dL    Albumin 4.06 3.50 - 5.20 g/dL    ALT (SGPT) 75 (H) 1 - 33 U/L    AST (SGOT) 70 (H) 1 - 32 U/L    Alkaline Phosphatase 110 39 - 117 U/L    Total Bilirubin 0.2 0.0 - 1.2 mg/dL    eGFR Non African Amer 72 >60 mL/min/1.73    Globulin 3.1 gm/dL    A/G Ratio 1.3 g/dL    BUN/Creatinine Ratio 12.8 7.0 - 25.0    Anion Gap 12.5 5.0 - 15.0 mmol/L   Lipase    Specimen: Arm, Right; Blood   Result Value Ref Range    Lipase 97 (H) 13 - 60 U/L   Urinalysis With Culture If Indicated - Urine, Clean Catch    Specimen: Urine, Clean Catch   Result Value Ref Range    Color, UA Yellow Yellow, Straw    Appearance, UA Clear Clear    pH, UA <=5.0 5.0 - 8.0    Specific Gravity, UA 1.010 1.005 - 1.030    Glucose, UA Negative Negative    Ketones, UA Negative Negative    Bilirubin, UA Negative Negative    Blood, UA Trace (A) Negative    Protein, UA Negative Negative     Leuk Esterase, UA Negative Negative    Nitrite, UA Negative Negative    Urobilinogen, UA 0.2 E.U./dL 0.2 - 1.0 E.U./dL   CBC Auto Differential    Specimen: Arm, Right; Blood   Result Value Ref Range    WBC 9.10 3.40 - 10.80 10*3/mm3    RBC 5.08 3.77 - 5.28 10*6/mm3    Hemoglobin 15.4 12.0 - 15.9 g/dL    Hematocrit 47.3 (H) 34.0 - 46.6 %    MCV 93.1 79.0 - 97.0 fL    MCH 30.3 26.6 - 33.0 pg    MCHC 32.6 31.5 - 35.7 g/dL    RDW 11.9 (L) 12.3 - 15.4 %    RDW-SD 41.2 37.0 - 54.0 fl    MPV 10.5 6.0 - 12.0 fL    Platelets 257 140 - 450 10*3/mm3    Neutrophil % 53.0 42.7 - 76.0 %    Lymphocyte % 36.0 19.6 - 45.3 %    Monocyte % 7.0 5.0 - 12.0 %    Eosinophil % 3.0 0.3 - 6.2 %    Basophil % 0.8 0.0 - 1.5 %    Immature Grans % 0.2 0.0 - 0.5 %    Neutrophils, Absolute 4.82 1.70 - 7.00 10*3/mm3    Lymphocytes, Absolute 3.28 (H) 0.70 - 3.10 10*3/mm3    Monocytes, Absolute 0.64 0.10 - 0.90 10*3/mm3    Eosinophils, Absolute 0.27 0.00 - 0.40 10*3/mm3    Basophils, Absolute 0.07 0.00 - 0.20 10*3/mm3    Immature Grans, Absolute 0.02 0.00 - 0.05 10*3/mm3    nRBC 0.0 0.0 - 0.2 /100 WBC   Urinalysis, Microscopic Only - Urine, Clean Catch    Specimen: Urine, Clean Catch   Result Value Ref Range    RBC, UA 6-12 (A) None Seen, 0-2 /HPF    WBC, UA 0-2 None Seen, 0-2 /HPF    Bacteria, UA 1+ (A) None Seen /HPF    Squamous Epithelial Cells, UA 7-12 (A) None Seen, 0-2 /HPF    Hyaline Casts, UA None Seen None Seen /LPF    Methodology Automated Microscopy    Amylase    Specimen: Arm, Right; Blood   Result Value Ref Range    Amylase 91 28 - 100 U/L   Troponin    Specimen: Arm, Right; Blood   Result Value Ref Range    Troponin T <0.010 0.000 - 0.030 ng/mL   D-dimer, Quantitative    Specimen: Blood   Result Value Ref Range    D-Dimer, Quantitative 0.34 0.00 - 0.50 MCGFEU/mL   BNP    Specimen: Arm, Right; Blood   Result Value Ref Range    proBNP 35.4 0.0 - 450.0 pg/mL   Light Blue Top   Result Value Ref Range    Extra Tube hold for add-on    Green  Top (Gel)   Result Value Ref Range    Extra Tube Hold for add-ons.    Lavender Top   Result Value Ref Range    Extra Tube hold for add-on    Gold Top - SST   Result Value Ref Range    Extra Tube Hold for add-ons.                 ED Course  ED Course as of Apr 17 1412   Sat Apr 17, 2021   1340 Patient's emergency department stay has been uneventful.  She has shown no signs of acute distress.  Patient and I discussed her test results and her plan of care.  She voices understanding and agreement.    [CM]      ED Course User Index  [CM] Masood Eduardo MD                                           [facility]    Final diagnoses:   Atypical pneumonia   Epigastric pain   Constipation, unspecified constipation type       ED Disposition  ED Disposition     ED Disposition Condition Comment    Discharge Stable           Please note that portions of this note were completed with a voice recognition program.              Masood Eduardo MD  04/17/21 7438

## 2021-04-18 LAB
QT INTERVAL: 362 MS
QTC INTERVAL: 417 MS

## 2021-07-27 ENCOUNTER — APPOINTMENT (OUTPATIENT)
Dept: CT IMAGING | Facility: HOSPITAL | Age: 46
End: 2021-07-27

## 2021-07-27 ENCOUNTER — APPOINTMENT (OUTPATIENT)
Dept: GENERAL RADIOLOGY | Facility: HOSPITAL | Age: 46
End: 2021-07-27

## 2021-07-27 ENCOUNTER — HOSPITAL ENCOUNTER (EMERGENCY)
Facility: HOSPITAL | Age: 46
Discharge: HOME | End: 2021-07-27
Attending: EMERGENCY MEDICINE | Admitting: EMERGENCY MEDICINE

## 2021-07-27 VITALS
WEIGHT: 165 LBS | HEART RATE: 81 BPM | OXYGEN SATURATION: 98 % | RESPIRATION RATE: 16 BRPM | HEIGHT: 66 IN | DIASTOLIC BLOOD PRESSURE: 71 MMHG | TEMPERATURE: 97.7 F | BODY MASS INDEX: 26.52 KG/M2 | SYSTOLIC BLOOD PRESSURE: 122 MMHG

## 2021-07-27 DIAGNOSIS — R09.1 PLEURISY: Primary | ICD-10-CM

## 2021-07-27 LAB
ALBUMIN SERPL-MCNC: 4.15 G/DL (ref 3.5–5.2)
ALBUMIN/GLOB SERPL: 1.4 G/DL
ALP SERPL-CCNC: 108 U/L (ref 39–117)
ALT SERPL W P-5'-P-CCNC: 75 U/L (ref 1–33)
ANION GAP SERPL CALCULATED.3IONS-SCNC: 11.5 MMOL/L (ref 5–15)
AST SERPL-CCNC: 66 U/L (ref 1–32)
BACTERIA UR QL AUTO: ABNORMAL /HPF
BASOPHILS # BLD AUTO: 0.06 10*3/MM3 (ref 0–0.2)
BASOPHILS NFR BLD AUTO: 0.8 % (ref 0–1.5)
BILIRUB SERPL-MCNC: 0.3 MG/DL (ref 0–1.2)
BILIRUB UR QL STRIP: NEGATIVE
BUN SERPL-MCNC: 9 MG/DL (ref 6–20)
BUN/CREAT SERPL: 10.8 (ref 7–25)
CALCIUM SPEC-SCNC: 10.1 MG/DL (ref 8.6–10.5)
CHLORIDE SERPL-SCNC: 106 MMOL/L (ref 98–107)
CLARITY UR: CLEAR
CO2 SERPL-SCNC: 23.5 MMOL/L (ref 22–29)
COLOR UR: YELLOW
CREAT SERPL-MCNC: 0.83 MG/DL (ref 0.57–1)
CRP SERPL-MCNC: 0.45 MG/DL (ref 0–0.5)
D DIMER PPP FEU-MCNC: 0.64 MCGFEU/ML (ref 0–0.5)
DEPRECATED RDW RBC AUTO: 42.5 FL (ref 37–54)
EOSINOPHIL # BLD AUTO: 0.36 10*3/MM3 (ref 0–0.4)
EOSINOPHIL NFR BLD AUTO: 4.9 % (ref 0.3–6.2)
ERYTHROCYTE [DISTWIDTH] IN BLOOD BY AUTOMATED COUNT: 12.4 % (ref 12.3–15.4)
ERYTHROCYTE [SEDIMENTATION RATE] IN BLOOD: 1 MM/HR (ref 0–20)
FLUAV RNA RESP QL NAA+PROBE: NOT DETECTED
FLUBV RNA RESP QL NAA+PROBE: NOT DETECTED
GFR SERPL CREATININE-BSD FRML MDRD: 75 ML/MIN/1.73
GLOBULIN UR ELPH-MCNC: 3.1 GM/DL
GLUCOSE SERPL-MCNC: 113 MG/DL (ref 65–99)
GLUCOSE UR STRIP-MCNC: NEGATIVE MG/DL
HCT VFR BLD AUTO: 44.4 % (ref 34–46.6)
HGB BLD-MCNC: 14.7 G/DL (ref 12–15.9)
HGB UR QL STRIP.AUTO: ABNORMAL
HYALINE CASTS UR QL AUTO: ABNORMAL /LPF
IMM GRANULOCYTES # BLD AUTO: 0.01 10*3/MM3 (ref 0–0.05)
IMM GRANULOCYTES NFR BLD AUTO: 0.1 % (ref 0–0.5)
KETONES UR QL STRIP: NEGATIVE
LEUKOCYTE ESTERASE UR QL STRIP.AUTO: NEGATIVE
LYMPHOCYTES # BLD AUTO: 2.21 10*3/MM3 (ref 0.7–3.1)
LYMPHOCYTES NFR BLD AUTO: 30.3 % (ref 19.6–45.3)
MCH RBC QN AUTO: 30.6 PG (ref 26.6–33)
MCHC RBC AUTO-ENTMCNC: 33.1 G/DL (ref 31.5–35.7)
MCV RBC AUTO: 92.5 FL (ref 79–97)
MONOCYTES # BLD AUTO: 0.59 10*3/MM3 (ref 0.1–0.9)
MONOCYTES NFR BLD AUTO: 8.1 % (ref 5–12)
NEUTROPHILS NFR BLD AUTO: 4.06 10*3/MM3 (ref 1.7–7)
NEUTROPHILS NFR BLD AUTO: 55.8 % (ref 42.7–76)
NITRITE UR QL STRIP: NEGATIVE
NRBC BLD AUTO-RTO: 0 /100 WBC (ref 0–0.2)
PH UR STRIP.AUTO: 5.5 [PH] (ref 5–8)
PLATELET # BLD AUTO: 228 10*3/MM3 (ref 140–450)
PMV BLD AUTO: 10.3 FL (ref 6–12)
POTASSIUM SERPL-SCNC: 4.3 MMOL/L (ref 3.5–5.2)
PROT SERPL-MCNC: 7.2 G/DL (ref 6–8.5)
PROT UR QL STRIP: NEGATIVE
RBC # BLD AUTO: 4.8 10*6/MM3 (ref 3.77–5.28)
RBC # UR: ABNORMAL /HPF
REF LAB TEST METHOD: ABNORMAL
SARS-COV-2 RNA RESP QL NAA+PROBE: NOT DETECTED
SODIUM SERPL-SCNC: 141 MMOL/L (ref 136–145)
SP GR UR STRIP: 1.01 (ref 1–1.03)
SQUAMOUS #/AREA URNS HPF: ABNORMAL /HPF
TROPONIN T SERPL-MCNC: <0.01 NG/ML (ref 0–0.03)
UROBILINOGEN UR QL STRIP: ABNORMAL
WBC # BLD AUTO: 7.29 10*3/MM3 (ref 3.4–10.8)
WBC UR QL AUTO: ABNORMAL /HPF

## 2021-07-27 PROCEDURE — 25010000002 METHYLPREDNISOLONE PER 125 MG: Performed by: PHYSICIAN ASSISTANT

## 2021-07-27 PROCEDURE — 93010 ELECTROCARDIOGRAM REPORT: CPT | Performed by: INTERNAL MEDICINE

## 2021-07-27 PROCEDURE — 85379 FIBRIN DEGRADATION QUANT: CPT | Performed by: PHYSICIAN ASSISTANT

## 2021-07-27 PROCEDURE — 71275 CT ANGIOGRAPHY CHEST: CPT

## 2021-07-27 PROCEDURE — 84484 ASSAY OF TROPONIN QUANT: CPT | Performed by: PHYSICIAN ASSISTANT

## 2021-07-27 PROCEDURE — 71275 CT ANGIOGRAPHY CHEST: CPT | Performed by: RADIOLOGY

## 2021-07-27 PROCEDURE — 0 IOPAMIDOL PER 1 ML: Performed by: EMERGENCY MEDICINE

## 2021-07-27 PROCEDURE — 85652 RBC SED RATE AUTOMATED: CPT | Performed by: PHYSICIAN ASSISTANT

## 2021-07-27 PROCEDURE — 96375 TX/PRO/DX INJ NEW DRUG ADDON: CPT

## 2021-07-27 PROCEDURE — 85025 COMPLETE CBC W/AUTO DIFF WBC: CPT | Performed by: PHYSICIAN ASSISTANT

## 2021-07-27 PROCEDURE — 71046 X-RAY EXAM CHEST 2 VIEWS: CPT

## 2021-07-27 PROCEDURE — 80053 COMPREHEN METABOLIC PANEL: CPT | Performed by: PHYSICIAN ASSISTANT

## 2021-07-27 PROCEDURE — 86140 C-REACTIVE PROTEIN: CPT | Performed by: PHYSICIAN ASSISTANT

## 2021-07-27 PROCEDURE — 93005 ELECTROCARDIOGRAM TRACING: CPT | Performed by: PHYSICIAN ASSISTANT

## 2021-07-27 PROCEDURE — 81001 URINALYSIS AUTO W/SCOPE: CPT | Performed by: PHYSICIAN ASSISTANT

## 2021-07-27 PROCEDURE — 87636 SARSCOV2 & INF A&B AMP PRB: CPT | Performed by: PHYSICIAN ASSISTANT

## 2021-07-27 PROCEDURE — 71046 X-RAY EXAM CHEST 2 VIEWS: CPT | Performed by: RADIOLOGY

## 2021-07-27 PROCEDURE — 96374 THER/PROPH/DIAG INJ IV PUSH: CPT

## 2021-07-27 PROCEDURE — 25010000002 KETOROLAC TROMETHAMINE PER 15 MG: Performed by: PHYSICIAN ASSISTANT

## 2021-07-27 PROCEDURE — 99283 EMERGENCY DEPT VISIT LOW MDM: CPT

## 2021-07-27 RX ORDER — KETOROLAC TROMETHAMINE 10 MG/1
10 TABLET, FILM COATED ORAL EVERY 6 HOURS PRN
Qty: 15 TABLET | Refills: 0 | OUTPATIENT
Start: 2021-07-27 | End: 2022-01-11

## 2021-07-27 RX ORDER — METHYLPREDNISOLONE SODIUM SUCCINATE 125 MG/2ML
125 INJECTION, POWDER, LYOPHILIZED, FOR SOLUTION INTRAMUSCULAR; INTRAVENOUS ONCE
Status: COMPLETED | OUTPATIENT
Start: 2021-07-27 | End: 2021-07-27

## 2021-07-27 RX ORDER — PREDNISONE 20 MG/1
20 TABLET ORAL 3 TIMES DAILY
Qty: 15 TABLET | Refills: 0 | Status: SHIPPED | OUTPATIENT
Start: 2021-07-27 | End: 2021-08-01

## 2021-07-27 RX ORDER — KETOROLAC TROMETHAMINE 30 MG/ML
15 INJECTION, SOLUTION INTRAMUSCULAR; INTRAVENOUS ONCE
Status: COMPLETED | OUTPATIENT
Start: 2021-07-27 | End: 2021-07-27

## 2021-07-27 RX ORDER — SODIUM CHLORIDE 0.9 % (FLUSH) 0.9 %
10 SYRINGE (ML) INJECTION AS NEEDED
Status: DISCONTINUED | OUTPATIENT
Start: 2021-07-27 | End: 2021-07-27 | Stop reason: HOSPADM

## 2021-07-27 RX ADMIN — METHYLPREDNISOLONE SODIUM SUCCINATE 125 MG: 125 INJECTION, POWDER, FOR SOLUTION INTRAMUSCULAR; INTRAVENOUS at 09:18

## 2021-07-27 RX ADMIN — SODIUM CHLORIDE 1000 ML: 9 INJECTION, SOLUTION INTRAVENOUS at 08:41

## 2021-07-27 RX ADMIN — IOPAMIDOL 70 ML: 755 INJECTION, SOLUTION INTRAVENOUS at 09:46

## 2021-07-27 RX ADMIN — KETOROLAC TROMETHAMINE 15 MG: 30 INJECTION, SOLUTION INTRAMUSCULAR at 09:17

## 2021-07-28 LAB
QT INTERVAL: 386 MS
QTC INTERVAL: 416 MS

## 2022-01-11 ENCOUNTER — HOSPITAL ENCOUNTER (EMERGENCY)
Facility: HOSPITAL | Age: 47
Discharge: HOME | End: 2022-01-11
Attending: EMERGENCY MEDICINE | Admitting: EMERGENCY MEDICINE

## 2022-01-11 ENCOUNTER — APPOINTMENT (OUTPATIENT)
Dept: GENERAL RADIOLOGY | Facility: HOSPITAL | Age: 47
End: 2022-01-11

## 2022-01-11 VITALS
DIASTOLIC BLOOD PRESSURE: 78 MMHG | TEMPERATURE: 98.6 F | BODY MASS INDEX: 26.68 KG/M2 | OXYGEN SATURATION: 98 % | RESPIRATION RATE: 16 BRPM | HEIGHT: 67 IN | WEIGHT: 170 LBS | SYSTOLIC BLOOD PRESSURE: 124 MMHG | HEART RATE: 78 BPM

## 2022-01-11 DIAGNOSIS — R07.89 CHEST WALL PAIN: ICD-10-CM

## 2022-01-11 DIAGNOSIS — J40 BRONCHITIS: Primary | ICD-10-CM

## 2022-01-11 LAB
ALBUMIN SERPL-MCNC: 4.44 G/DL (ref 3.5–5.2)
ALBUMIN/GLOB SERPL: 1.5 G/DL
ALP SERPL-CCNC: 136 U/L (ref 39–117)
ALT SERPL W P-5'-P-CCNC: 87 U/L (ref 1–33)
ANION GAP SERPL CALCULATED.3IONS-SCNC: 13.1 MMOL/L (ref 5–15)
AST SERPL-CCNC: 66 U/L (ref 1–32)
B PARAPERT DNA SPEC QL NAA+PROBE: NOT DETECTED
B PERT DNA SPEC QL NAA+PROBE: NOT DETECTED
BACTERIA UR QL AUTO: ABNORMAL /HPF
BASOPHILS # BLD AUTO: 0.06 10*3/MM3 (ref 0–0.2)
BASOPHILS NFR BLD AUTO: 0.5 % (ref 0–1.5)
BILIRUB SERPL-MCNC: 0.5 MG/DL (ref 0–1.2)
BILIRUB UR QL STRIP: NEGATIVE
BUN SERPL-MCNC: 8 MG/DL (ref 6–20)
BUN/CREAT SERPL: 9.4 (ref 7–25)
C PNEUM DNA NPH QL NAA+NON-PROBE: NOT DETECTED
CALCIUM SPEC-SCNC: 10.3 MG/DL (ref 8.6–10.5)
CHLORIDE SERPL-SCNC: 103 MMOL/L (ref 98–107)
CLARITY UR: CLEAR
CO2 SERPL-SCNC: 21.9 MMOL/L (ref 22–29)
COLOR UR: YELLOW
CREAT SERPL-MCNC: 0.85 MG/DL (ref 0.57–1)
D DIMER PPP FEU-MCNC: 0.4 MCGFEU/ML (ref 0–0.5)
DEPRECATED RDW RBC AUTO: 41.1 FL (ref 37–54)
EOSINOPHIL # BLD AUTO: 0.3 10*3/MM3 (ref 0–0.4)
EOSINOPHIL NFR BLD AUTO: 2.6 % (ref 0.3–6.2)
ERYTHROCYTE [DISTWIDTH] IN BLOOD BY AUTOMATED COUNT: 12.2 % (ref 12.3–15.4)
FLUAV SUBTYP SPEC NAA+PROBE: NOT DETECTED
FLUBV RNA ISLT QL NAA+PROBE: NOT DETECTED
GFR SERPL CREATININE-BSD FRML MDRD: 72 ML/MIN/1.73
GLOBULIN UR ELPH-MCNC: 3.1 GM/DL
GLUCOSE SERPL-MCNC: 174 MG/DL (ref 65–99)
GLUCOSE UR STRIP-MCNC: NEGATIVE MG/DL
HADV DNA SPEC NAA+PROBE: NOT DETECTED
HCOV 229E RNA SPEC QL NAA+PROBE: NOT DETECTED
HCOV HKU1 RNA SPEC QL NAA+PROBE: NOT DETECTED
HCOV NL63 RNA SPEC QL NAA+PROBE: NOT DETECTED
HCOV OC43 RNA SPEC QL NAA+PROBE: NOT DETECTED
HCT VFR BLD AUTO: 46 % (ref 34–46.6)
HGB BLD-MCNC: 15.3 G/DL (ref 12–15.9)
HGB UR QL STRIP.AUTO: ABNORMAL
HMPV RNA NPH QL NAA+NON-PROBE: NOT DETECTED
HOLD SPECIMEN: NORMAL
HOLD SPECIMEN: NORMAL
HPIV1 RNA ISLT QL NAA+PROBE: NOT DETECTED
HPIV2 RNA SPEC QL NAA+PROBE: NOT DETECTED
HPIV3 RNA NPH QL NAA+PROBE: NOT DETECTED
HPIV4 P GENE NPH QL NAA+PROBE: NOT DETECTED
HYALINE CASTS UR QL AUTO: ABNORMAL /LPF
IMM GRANULOCYTES # BLD AUTO: 0.03 10*3/MM3 (ref 0–0.05)
IMM GRANULOCYTES NFR BLD AUTO: 0.3 % (ref 0–0.5)
KETONES UR QL STRIP: NEGATIVE
LEUKOCYTE ESTERASE UR QL STRIP.AUTO: ABNORMAL
LYMPHOCYTES # BLD AUTO: 2.38 10*3/MM3 (ref 0.7–3.1)
LYMPHOCYTES NFR BLD AUTO: 20.5 % (ref 19.6–45.3)
M PNEUMO IGG SER IA-ACNC: NOT DETECTED
MCH RBC QN AUTO: 30.5 PG (ref 26.6–33)
MCHC RBC AUTO-ENTMCNC: 33.3 G/DL (ref 31.5–35.7)
MCV RBC AUTO: 91.6 FL (ref 79–97)
MONOCYTES # BLD AUTO: 0.67 10*3/MM3 (ref 0.1–0.9)
MONOCYTES NFR BLD AUTO: 5.8 % (ref 5–12)
NEUTROPHILS NFR BLD AUTO: 70.3 % (ref 42.7–76)
NEUTROPHILS NFR BLD AUTO: 8.16 10*3/MM3 (ref 1.7–7)
NITRITE UR QL STRIP: NEGATIVE
NRBC BLD AUTO-RTO: 0 /100 WBC (ref 0–0.2)
PH UR STRIP.AUTO: <=5 [PH] (ref 5–8)
PLATELET # BLD AUTO: 219 10*3/MM3 (ref 140–450)
PMV BLD AUTO: 10.8 FL (ref 6–12)
POTASSIUM SERPL-SCNC: 3.7 MMOL/L (ref 3.5–5.2)
PROT SERPL-MCNC: 7.5 G/DL (ref 6–8.5)
PROT UR QL STRIP: NEGATIVE
QT INTERVAL: 320 MS
QTC INTERVAL: 427 MS
RBC # BLD AUTO: 5.02 10*6/MM3 (ref 3.77–5.28)
RBC # UR STRIP: ABNORMAL /HPF
REF LAB TEST METHOD: ABNORMAL
RHINOVIRUS RNA SPEC NAA+PROBE: NOT DETECTED
RSV RNA NPH QL NAA+NON-PROBE: NOT DETECTED
SARS-COV-2 RNA NPH QL NAA+NON-PROBE: NOT DETECTED
SODIUM SERPL-SCNC: 138 MMOL/L (ref 136–145)
SP GR UR STRIP: <=1.005 (ref 1–1.03)
SQUAMOUS #/AREA URNS HPF: ABNORMAL /HPF
TROPONIN T SERPL-MCNC: <0.01 NG/ML (ref 0–0.03)
TROPONIN T SERPL-MCNC: <0.01 NG/ML (ref 0–0.03)
UROBILINOGEN UR QL STRIP: ABNORMAL
WBC # UR STRIP: ABNORMAL /HPF
WBC NRBC COR # BLD: 11.6 10*3/MM3 (ref 3.4–10.8)
WHOLE BLOOD HOLD SPECIMEN: NORMAL
WHOLE BLOOD HOLD SPECIMEN: NORMAL

## 2022-01-11 PROCEDURE — 85025 COMPLETE CBC W/AUTO DIFF WBC: CPT | Performed by: PHYSICIAN ASSISTANT

## 2022-01-11 PROCEDURE — 80053 COMPREHEN METABOLIC PANEL: CPT | Performed by: PHYSICIAN ASSISTANT

## 2022-01-11 PROCEDURE — 25010000002 KETOROLAC TROMETHAMINE PER 15 MG: Performed by: PHYSICIAN ASSISTANT

## 2022-01-11 PROCEDURE — 0202U NFCT DS 22 TRGT SARS-COV-2: CPT | Performed by: PHYSICIAN ASSISTANT

## 2022-01-11 PROCEDURE — 84484 ASSAY OF TROPONIN QUANT: CPT | Performed by: PHYSICIAN ASSISTANT

## 2022-01-11 PROCEDURE — 96374 THER/PROPH/DIAG INJ IV PUSH: CPT

## 2022-01-11 PROCEDURE — 71045 X-RAY EXAM CHEST 1 VIEW: CPT | Performed by: RADIOLOGY

## 2022-01-11 PROCEDURE — 85379 FIBRIN DEGRADATION QUANT: CPT | Performed by: PHYSICIAN ASSISTANT

## 2022-01-11 PROCEDURE — 99283 EMERGENCY DEPT VISIT LOW MDM: CPT

## 2022-01-11 PROCEDURE — 71045 X-RAY EXAM CHEST 1 VIEW: CPT

## 2022-01-11 PROCEDURE — 93010 ELECTROCARDIOGRAM REPORT: CPT | Performed by: INTERNAL MEDICINE

## 2022-01-11 PROCEDURE — 81001 URINALYSIS AUTO W/SCOPE: CPT | Performed by: PHYSICIAN ASSISTANT

## 2022-01-11 PROCEDURE — 93005 ELECTROCARDIOGRAM TRACING: CPT | Performed by: PHYSICIAN ASSISTANT

## 2022-01-11 PROCEDURE — 93005 ELECTROCARDIOGRAM TRACING: CPT | Performed by: EMERGENCY MEDICINE

## 2022-01-11 RX ORDER — BROMPHENIRAMINE MALEATE, PSEUDOEPHEDRINE HYDROCHLORIDE, AND DEXTROMETHORPHAN HYDROBROMIDE 2; 30; 10 MG/5ML; MG/5ML; MG/5ML
5 SYRUP ORAL 4 TIMES DAILY PRN
Qty: 120 ML | Refills: 0 | OUTPATIENT
Start: 2022-01-11 | End: 2022-04-08

## 2022-01-11 RX ORDER — SODIUM CHLORIDE 0.9 % (FLUSH) 0.9 %
10 SYRINGE (ML) INJECTION AS NEEDED
Status: DISCONTINUED | OUTPATIENT
Start: 2022-01-11 | End: 2022-01-11 | Stop reason: HOSPADM

## 2022-01-11 RX ORDER — ASPIRIN 81 MG/1
162 TABLET, CHEWABLE ORAL ONCE
Status: COMPLETED | OUTPATIENT
Start: 2022-01-11 | End: 2022-01-11

## 2022-01-11 RX ORDER — KETOROLAC TROMETHAMINE 30 MG/ML
15 INJECTION, SOLUTION INTRAMUSCULAR; INTRAVENOUS ONCE
Status: COMPLETED | OUTPATIENT
Start: 2022-01-11 | End: 2022-01-11

## 2022-01-11 RX ORDER — ASPIRIN 81 MG/1
324 TABLET, CHEWABLE ORAL ONCE
Status: DISCONTINUED | OUTPATIENT
Start: 2022-01-11 | End: 2022-01-11

## 2022-01-11 RX ADMIN — ASPIRIN 162 MG: 81 TABLET, CHEWABLE ORAL at 08:52

## 2022-01-11 RX ADMIN — KETOROLAC TROMETHAMINE 15 MG: 30 INJECTION, SOLUTION INTRAMUSCULAR at 11:04

## 2022-01-11 NOTE — ED PROVIDER NOTES
Subjective   46-year-old white female presents secondary to central chest pain.  Patient states that she was at work around 630 when she developed sharp stabbing pains in the center of her chest.  She states that she has had congestion and cough since yesterday.  She denies any fever.  She has had diarrhea over the past 2 days.  No blood.  No recent antibiotics.  No recent foreign travel or bad food/water contact.  No nausea vomiting.  No abdominal pain.  Patient is tender to touch in the center of her chest which reproduces the same type pain.  She denies any lower extremity swelling or pain.  She voices no other complaints at this time.  She has no known exposure to COVID however she does work in a nursing home.          Review of Systems   Constitutional: Negative.  Negative for fever.   HENT: Positive for congestion.    Respiratory: Positive for cough. Negative for shortness of breath.    Cardiovascular: Positive for chest pain. Negative for palpitations and leg swelling.   Gastrointestinal: Positive for diarrhea. Negative for abdominal pain.   Endocrine: Negative.    Genitourinary: Negative.  Negative for dysuria.   Skin: Negative.    Neurological: Negative.    Psychiatric/Behavioral: Negative.    All other systems reviewed and are negative.      Past Medical History:   Diagnosis Date   • Abnormal uterine bleeding (AUB)    • Acid reflux    • Arthritis    • Asthma     AS A CHILD   • Back injury    • Cancer (CMS/HCC)     surgery to remove cervical cancer   • Cervical cancer (CMS/HCC)    • Endometriosis        Allergies   Allergen Reactions   • Codeine      States she does not like the way it makes her feel       Past Surgical History:   Procedure Laterality Date   • CERVIX SURGERY     • DILATATION AND CURETTAGE     • TOTAL LAPAROSCOPIC HYSTERECTOMY N/A 6/2/2020    Procedure: TOTAL LAPAROSCOPIC HYSTERECTOMY BILATERAL SALPINGECTOMY OOPHORECTOMY WITH DAVINCI ROBOT, CYSTOSCOPY;  Surgeon: Myrna Rojas DO;   Location: [facility];  Service: [facility];  Laterality: N/A;  Robot docked at 0815, undocked at 0915   • TUBAL ABDOMINAL LIGATION         Family History   Problem Relation Age of Onset   • Cirrhosis Mother    • Diabetes Mother    • Heart failure Mother    • Heart failure Father        Social History     Socioeconomic History   • Marital status:    Tobacco Use   • Smoking status: Current Every Day Smoker     Packs/day: 1.50     Years: 30.00     Pack years: 45.00     Types: Cigarettes   • Smokeless tobacco: Never Used   Substance and Sexual Activity   • Alcohol use: No   • Drug use: No   • Sexual activity: Defer           Objective   Physical Exam  Vitals and nursing note reviewed.   Constitutional:       General: She is not in acute distress.     Appearance: She is well-developed. She is not diaphoretic.   HENT:      Head: Normocephalic and atraumatic.      Right Ear: External ear normal.      Left Ear: External ear normal.      Nose: Nose normal.   Eyes:      Conjunctiva/sclera: Conjunctivae normal.      Pupils: Pupils are equal, round, and reactive to light.   Neck:      Vascular: No JVD.      Trachea: No tracheal deviation.   Cardiovascular:      Rate and Rhythm: Normal rate and regular rhythm.      Heart sounds: Normal heart sounds. No murmur heard.      Pulmonary:      Effort: Pulmonary effort is normal. No respiratory distress.      Breath sounds: Normal breath sounds. No wheezing.   Chest:      Chest wall: Tenderness (parasternal border) present.   Abdominal:      General: Bowel sounds are normal.      Palpations: Abdomen is soft.      Tenderness: There is no abdominal tenderness.   Musculoskeletal:         General: No deformity. Normal range of motion.      Cervical back: Normal range of motion and neck supple.   Skin:     General: Skin is warm and dry.      Coloration: Skin is not pale.      Findings: No erythema or rash.   Neurological:      Mental Status: She is alert and oriented to person, place,  and time.      Cranial Nerves: No cranial nerve deficit.   Psychiatric:         Behavior: Behavior normal.         Thought Content: Thought content normal.         Procedures           ED Course  ED Course as of 01/11/22 1758 Tue Jan 11, 2022   1038 Patient's chest discomfort is reproducible to touch along her sternal border.  She has a heart score of 2.  She was counseled on signs and symptoms worsen along with appropriate follow-up.  She voiced understanding. [JI]      ED Course User Index  [JI] Binh Hutchison PA                                                 [facility]  Number of Diagnoses or Management Options  Bronchitis: new and requires workup  Chest wall pain: new and requires workup     Amount and/or Complexity of Data Reviewed  Clinical lab tests: reviewed and ordered  Tests in the radiology section of CPT®: reviewed and ordered  Tests in the medicine section of CPT®: reviewed and ordered  Discuss the patient with other providers: yes  Independent visualization of images, tracings, or specimens: yes        Final diagnoses:   Bronchitis   Chest wall pain       ED Disposition  ED Disposition     ED Disposition Condition Comment    Discharge Stable Pt educated on discharge instructions no distress vitals wdl sent home with discharge instructions and scripts           Fern Self MD  [address] [facility] [address] [phone]    In 2 days  if persists         Medication List      New Prescriptions    brompheniramine-pseudoephedrine-DM 30-2-10 MG/5ML syrup  Take 5 mL by mouth 4 (Four) Times a Day As Needed for Allergies.     diclofenac 50 MG EC tablet  Commonly known as: VOLTAREN  Take 1 tablet by mouth 3 (Three) Times a Day As Needed (pain).        Stop    doxycycline 100 MG capsule  Commonly known as: MONODOX     ketorolac 10 MG tablet  Commonly known as: TORADOL           Where to Get Your Medications      You can get these medications from any pharmacy    Bring a paper prescription for  each of these medications  · brompheniramine-pseudoephedrine-DM 30-2-10 MG/5ML syrup  · diclofenac 50 MG EC tablet          Binh Hutchison, PA  01/11/22 0684

## 2022-04-08 ENCOUNTER — HOSPITAL ENCOUNTER (EMERGENCY)
Facility: HOSPITAL | Age: 47
Discharge: HOME | End: 2022-04-08
Attending: EMERGENCY MEDICINE | Admitting: EMERGENCY MEDICINE

## 2022-04-08 ENCOUNTER — APPOINTMENT (OUTPATIENT)
Dept: CT IMAGING | Facility: HOSPITAL | Age: 47
End: 2022-04-08

## 2022-04-08 VITALS
DIASTOLIC BLOOD PRESSURE: 70 MMHG | HEIGHT: 66 IN | OXYGEN SATURATION: 99 % | RESPIRATION RATE: 18 BRPM | WEIGHT: 150 LBS | HEART RATE: 87 BPM | SYSTOLIC BLOOD PRESSURE: 111 MMHG | TEMPERATURE: 98 F | BODY MASS INDEX: 24.11 KG/M2

## 2022-04-08 DIAGNOSIS — N30.01 ACUTE CYSTITIS WITH HEMATURIA: Primary | ICD-10-CM

## 2022-04-08 DIAGNOSIS — R10.32 LLQ PAIN: ICD-10-CM

## 2022-04-08 LAB
ALBUMIN SERPL-MCNC: 4.11 G/DL (ref 3.5–5.2)
ALBUMIN/GLOB SERPL: 1.2 G/DL
ALP SERPL-CCNC: 132 U/L (ref 39–117)
ALT SERPL W P-5'-P-CCNC: 49 U/L (ref 1–33)
ANION GAP SERPL CALCULATED.3IONS-SCNC: 13.1 MMOL/L (ref 5–15)
AST SERPL-CCNC: 49 U/L (ref 1–32)
BACTERIA UR QL AUTO: ABNORMAL /HPF
BASOPHILS # BLD AUTO: 0.07 10*3/MM3 (ref 0–0.2)
BASOPHILS NFR BLD AUTO: 0.4 % (ref 0–1.5)
BILIRUB SERPL-MCNC: 0.7 MG/DL (ref 0–1.2)
BILIRUB UR QL STRIP: NEGATIVE
BUN SERPL-MCNC: 8 MG/DL (ref 6–20)
BUN/CREAT SERPL: 8.2 (ref 7–25)
CALCIUM SPEC-SCNC: 9.8 MG/DL (ref 8.6–10.5)
CHLORIDE SERPL-SCNC: 101 MMOL/L (ref 98–107)
CLARITY UR: ABNORMAL
CO2 SERPL-SCNC: 23.9 MMOL/L (ref 22–29)
COLOR UR: ABNORMAL
CREAT SERPL-MCNC: 0.97 MG/DL (ref 0.57–1)
CRP SERPL-MCNC: 3.17 MG/DL (ref 0–0.5)
D-LACTATE SERPL-SCNC: 1.6 MMOL/L (ref 0.5–2)
DEPRECATED RDW RBC AUTO: 40.8 FL (ref 37–54)
EGFRCR SERPLBLD CKD-EPI 2021: 73.6 ML/MIN/1.73
EOSINOPHIL # BLD AUTO: 0.14 10*3/MM3 (ref 0–0.4)
EOSINOPHIL NFR BLD AUTO: 0.9 % (ref 0.3–6.2)
ERYTHROCYTE [DISTWIDTH] IN BLOOD BY AUTOMATED COUNT: 12.3 % (ref 12.3–15.4)
GLOBULIN UR ELPH-MCNC: 3.3 GM/DL
GLUCOSE SERPL-MCNC: 171 MG/DL (ref 65–99)
GLUCOSE UR STRIP-MCNC: NEGATIVE MG/DL
HCT VFR BLD AUTO: 43.9 % (ref 34–46.6)
HGB BLD-MCNC: 14.8 G/DL (ref 12–15.9)
HGB UR QL STRIP.AUTO: ABNORMAL
HOLD SPECIMEN: NORMAL
HOLD SPECIMEN: NORMAL
HYALINE CASTS UR QL AUTO: ABNORMAL /LPF
IMM GRANULOCYTES # BLD AUTO: 0.07 10*3/MM3 (ref 0–0.05)
IMM GRANULOCYTES NFR BLD AUTO: 0.4 % (ref 0–0.5)
KETONES UR QL STRIP: NEGATIVE
LEUKOCYTE ESTERASE UR QL STRIP.AUTO: ABNORMAL
LIPASE SERPL-CCNC: 18 U/L (ref 13–60)
LYMPHOCYTES # BLD AUTO: 2.28 10*3/MM3 (ref 0.7–3.1)
LYMPHOCYTES NFR BLD AUTO: 14.4 % (ref 19.6–45.3)
MCH RBC QN AUTO: 30.5 PG (ref 26.6–33)
MCHC RBC AUTO-ENTMCNC: 33.7 G/DL (ref 31.5–35.7)
MCV RBC AUTO: 90.3 FL (ref 79–97)
MONOCYTES # BLD AUTO: 1.01 10*3/MM3 (ref 0.1–0.9)
MONOCYTES NFR BLD AUTO: 6.4 % (ref 5–12)
NEUTROPHILS NFR BLD AUTO: 12.22 10*3/MM3 (ref 1.7–7)
NEUTROPHILS NFR BLD AUTO: 77.5 % (ref 42.7–76)
NITRITE UR QL STRIP: NEGATIVE
NRBC BLD AUTO-RTO: 0 /100 WBC (ref 0–0.2)
PH UR STRIP.AUTO: 5.5 [PH] (ref 5–8)
PLATELET # BLD AUTO: 237 10*3/MM3 (ref 140–450)
PMV BLD AUTO: 10.5 FL (ref 6–12)
POTASSIUM SERPL-SCNC: 3.7 MMOL/L (ref 3.5–5.2)
PROT SERPL-MCNC: 7.4 G/DL (ref 6–8.5)
PROT UR QL STRIP: ABNORMAL
RBC # BLD AUTO: 4.86 10*6/MM3 (ref 3.77–5.28)
RBC # UR STRIP: ABNORMAL /HPF
REF LAB TEST METHOD: ABNORMAL
SODIUM SERPL-SCNC: 138 MMOL/L (ref 136–145)
SP GR UR STRIP: 1.01 (ref 1–1.03)
SQUAMOUS #/AREA URNS HPF: ABNORMAL /HPF
UROBILINOGEN UR QL STRIP: ABNORMAL
WBC # UR STRIP: ABNORMAL /HPF
WBC NRBC COR # BLD: 15.79 10*3/MM3 (ref 3.4–10.8)
WHOLE BLOOD HOLD SPECIMEN: NORMAL
WHOLE BLOOD HOLD SPECIMEN: NORMAL

## 2022-04-08 PROCEDURE — 96365 THER/PROPH/DIAG IV INF INIT: CPT

## 2022-04-08 PROCEDURE — 25010000002 KETOROLAC TROMETHAMINE PER 15 MG: Performed by: NURSE PRACTITIONER

## 2022-04-08 PROCEDURE — 87077 CULTURE AEROBIC IDENTIFY: CPT | Performed by: NURSE PRACTITIONER

## 2022-04-08 PROCEDURE — 81001 URINALYSIS AUTO W/SCOPE: CPT | Performed by: NURSE PRACTITIONER

## 2022-04-08 PROCEDURE — 83690 ASSAY OF LIPASE: CPT | Performed by: NURSE PRACTITIONER

## 2022-04-08 PROCEDURE — 87086 URINE CULTURE/COLONY COUNT: CPT | Performed by: NURSE PRACTITIONER

## 2022-04-08 PROCEDURE — 99283 EMERGENCY DEPT VISIT LOW MDM: CPT

## 2022-04-08 PROCEDURE — 74177 CT ABD & PELVIS W/CONTRAST: CPT

## 2022-04-08 PROCEDURE — 25010000002 IOPAMIDOL 61 % SOLUTION: Performed by: EMERGENCY MEDICINE

## 2022-04-08 PROCEDURE — 80053 COMPREHEN METABOLIC PANEL: CPT | Performed by: NURSE PRACTITIONER

## 2022-04-08 PROCEDURE — 87186 SC STD MICRODIL/AGAR DIL: CPT | Performed by: NURSE PRACTITIONER

## 2022-04-08 PROCEDURE — 83605 ASSAY OF LACTIC ACID: CPT | Performed by: NURSE PRACTITIONER

## 2022-04-08 PROCEDURE — 85025 COMPLETE CBC W/AUTO DIFF WBC: CPT | Performed by: NURSE PRACTITIONER

## 2022-04-08 PROCEDURE — 74177 CT ABD & PELVIS W/CONTRAST: CPT | Performed by: RADIOLOGY

## 2022-04-08 PROCEDURE — 96375 TX/PRO/DX INJ NEW DRUG ADDON: CPT

## 2022-04-08 PROCEDURE — 87040 BLOOD CULTURE FOR BACTERIA: CPT | Performed by: NURSE PRACTITIONER

## 2022-04-08 PROCEDURE — 25010000002 CEFTRIAXONE PER 250 MG: Performed by: NURSE PRACTITIONER

## 2022-04-08 PROCEDURE — 86140 C-REACTIVE PROTEIN: CPT | Performed by: NURSE PRACTITIONER

## 2022-04-08 RX ORDER — CEFDINIR 300 MG/1
300 CAPSULE ORAL 2 TIMES DAILY
Qty: 20 CAPSULE | Refills: 0 | Status: SHIPPED | OUTPATIENT
Start: 2022-04-08 | End: 2022-09-12

## 2022-04-08 RX ORDER — ONDANSETRON 4 MG/1
4 TABLET, FILM COATED ORAL EVERY 6 HOURS PRN
Qty: 15 TABLET | Refills: 0 | Status: SHIPPED | OUTPATIENT
Start: 2022-04-08 | End: 2022-06-30 | Stop reason: SDUPTHER

## 2022-04-08 RX ORDER — KETOROLAC TROMETHAMINE 30 MG/ML
30 INJECTION, SOLUTION INTRAMUSCULAR; INTRAVENOUS ONCE
Status: COMPLETED | OUTPATIENT
Start: 2022-04-08 | End: 2022-04-08

## 2022-04-08 RX ORDER — SODIUM CHLORIDE 0.9 % (FLUSH) 0.9 %
10 SYRINGE (ML) INJECTION AS NEEDED
Status: DISCONTINUED | OUTPATIENT
Start: 2022-04-08 | End: 2022-04-08 | Stop reason: HOSPADM

## 2022-04-08 RX ADMIN — SODIUM CHLORIDE 2 G: 900 INJECTION INTRAVENOUS at 08:25

## 2022-04-08 RX ADMIN — SODIUM CHLORIDE 1000 ML: 9 INJECTION, SOLUTION INTRAVENOUS at 06:55

## 2022-04-08 RX ADMIN — KETOROLAC TROMETHAMINE 30 MG: 30 INJECTION, SOLUTION INTRAMUSCULAR at 06:55

## 2022-04-08 RX ADMIN — IOPAMIDOL 85 ML: 612 INJECTION, SOLUTION INTRAVENOUS at 08:18

## 2022-04-08 NOTE — ED PROVIDER NOTES
Subjective   Patient is a 46-year-old female with past medical history significant for GERD, asthma, arthritis, and history of cervical cancer.  She presents to the ED today with complaints of left lower quadrant abdominal pain and left flank pain.  She reports that symptoms started last night and reports that she woke up with a fever this morning.  She reports her temperature was 100.  She reports she has had some dysuria.  She denies any other significant complaints or concerns.      History provided by:  Patient  Flank Pain  Pain location:  LLQ and L flank  Pain quality: sharp    Pain severity:  Moderate  Onset quality:  Sudden  Duration:  1 day  Timing:  Constant  Progression:  Waxing and waning  Context: not alcohol use, not awakening from sleep, not diet changes, not eating, not laxative use, not medication withdrawal, not previous surgeries, not recent illness, not recent sexual activity, not recent travel, not retching, not sick contacts, not suspicious food intake and not trauma    Relieved by:  None tried  Associated symptoms: dysuria, fatigue, fever and nausea    Associated symptoms: no anorexia, no belching, no chest pain, no chills, no constipation, no cough, no diarrhea, no flatus, no hematemesis, no hematochezia, no hematuria, no melena, no shortness of breath, no sore throat, no vaginal bleeding, no vaginal discharge and no vomiting    Risk factors: obesity        Review of Systems   Constitutional: Positive for fatigue and fever. Negative for chills.   HENT: Negative.  Negative for congestion and sore throat.    Eyes: Negative.    Respiratory: Negative.  Negative for cough and shortness of breath.    Cardiovascular: Negative.  Negative for chest pain.   Gastrointestinal: Positive for nausea. Negative for abdominal pain, anorexia, constipation, diarrhea, flatus, hematemesis, hematochezia, melena and vomiting.   Endocrine: Negative.    Genitourinary: Positive for dysuria and flank pain. Negative for  hematuria, vaginal bleeding and vaginal discharge.   Skin: Negative.    Allergic/Immunologic: Negative.    Neurological: Negative.    Hematological: Negative.    Psychiatric/Behavioral: Negative.    All other systems reviewed and are negative.      Past Medical History:   Diagnosis Date   • Abnormal uterine bleeding (AUB)    • Acid reflux    • Arthritis    • Asthma     AS A CHILD   • Back injury    • Cancer (HCC)     surgery to remove cervical cancer   • Cervical cancer (HCC)    • Endometriosis        Allergies   Allergen Reactions   • Codeine      States she does not like the way it makes her feel       Past Surgical History:   Procedure Laterality Date   • CERVIX SURGERY     • DILATATION AND CURETTAGE     • TOTAL LAPAROSCOPIC HYSTERECTOMY N/A 6/2/2020    Procedure: TOTAL LAPAROSCOPIC HYSTERECTOMY BILATERAL SALPINGECTOMY OOPHORECTOMY WITH DAVINCI ROBOT, CYSTOSCOPY;  Surgeon: yMrna Rojas DO;  Location: [facility];  Service: [facility];  Laterality: N/A;  Robot docked at 0815, undocked at 0915   • TUBAL ABDOMINAL LIGATION         Family History   Problem Relation Age of Onset   • Cirrhosis Mother    • Diabetes Mother    • Heart failure Mother    • Heart failure Father        Social History     Socioeconomic History   • Marital status:    Tobacco Use   • Smoking status: Current Every Day Smoker     Packs/day: 1.50     Years: 30.00     Pack years: 45.00     Types: Cigarettes   • Smokeless tobacco: Never Used   Substance and Sexual Activity   • Alcohol use: No   • Drug use: No   • Sexual activity: Defer           Objective   Physical Exam  Vitals and nursing note reviewed.   Constitutional:       General: She is not in acute distress.     Appearance: She is well-developed. She is not diaphoretic.   HENT:      Head: Normocephalic and atraumatic.      Right Ear: External ear normal.      Left Ear: External ear normal.      Nose: Nose normal.      Mouth/Throat:      Mouth: Mucous membranes are moist.       Pharynx: Oropharynx is clear.   Eyes:      Conjunctiva/sclera: Conjunctivae normal.      Pupils: Pupils are equal, round, and reactive to light.   Neck:      Vascular: No JVD.      Trachea: No tracheal deviation.   Cardiovascular:      Rate and Rhythm: Normal rate and regular rhythm.      Heart sounds: Normal heart sounds. No murmur heard.  Pulmonary:      Effort: Pulmonary effort is normal. No respiratory distress.      Breath sounds: Normal breath sounds. No wheezing.   Abdominal:      General: Bowel sounds are normal.      Palpations: Abdomen is soft.      Tenderness: There is abdominal tenderness in the left lower quadrant. There is left CVA tenderness.   Musculoskeletal:         General: No deformity. Normal range of motion.      Cervical back: Normal range of motion and neck supple.   Skin:     General: Skin is warm and dry.      Capillary Refill: Capillary refill takes less than 2 seconds.      Coloration: Skin is not pale.      Findings: No erythema or rash.   Neurological:      General: No focal deficit present.      Mental Status: She is alert and oriented to person, place, and time.      Cranial Nerves: No cranial nerve deficit.   Psychiatric:         Behavior: Behavior normal.         Thought Content: Thought content normal.         Procedures           ED Course  ED Course as of 04/08/22 1650   Fri Apr 08, 2022   0822 Took over for Bethany Singh.  Patient currently in CT. [JI]      ED Course User Index  [JI] Binh Hutchison PA                                                 [facility]  Number of Diagnoses or Management Options  Acute cystitis with hematuria: new and requires workup  LLQ pain: new and requires workup     Amount and/or Complexity of Data Reviewed  Clinical lab tests: reviewed and ordered  Tests in the radiology section of CPT®: reviewed and ordered  Discuss the patient with other providers: yes        Final diagnoses:   Acute cystitis with hematuria   LLQ pain       ED Disposition  ED Disposition      ED Disposition   Discharge    Condition   Stable    Comment   --             Fern Self MD  [address] [facility] [address]  [phone]    On 4/11/2022      [facility] Emergency Department  [address] [facility] [address] [phone]    If symptoms worsen         Medication List      New Prescriptions    cefdinir 300 MG capsule  Commonly known as: OMNICEF  Take 1 capsule by mouth 2 (Two) Times a Day.     ondansetron 4 MG tablet  Commonly known as: ZOFRAN  Take 1 tablet by mouth Every 6 (Six) Hours As Needed for Nausea or Vomiting.        Stop    brompheniramine-pseudoephedrine-DM 30-2-10 MG/5ML syrup           Where to Get Your Medications      You can get these medications from any pharmacy    Bring a paper prescription for each of these medications  · cefdinir 300 MG capsule  · diclofenac 50 MG EC tablet  · ondansetron 4 MG tablet          Binh Hutchison PA  04/08/22 6219

## 2022-04-10 LAB — BACTERIA SPEC AEROBE CULT: ABNORMAL

## 2022-04-13 LAB
BACTERIA SPEC AEROBE CULT: NORMAL
BACTERIA SPEC AEROBE CULT: NORMAL

## 2022-04-29 ENCOUNTER — OFFICE VISIT (OUTPATIENT)
Dept: UROLOGY | Facility: CLINIC | Age: 47
End: 2022-04-29

## 2022-04-29 VITALS — BODY MASS INDEX: 24.11 KG/M2 | HEIGHT: 66 IN | WEIGHT: 150 LBS

## 2022-04-29 DIAGNOSIS — K59.01 SLOW TRANSIT CONSTIPATION: ICD-10-CM

## 2022-04-29 DIAGNOSIS — R35.0 FREQUENCY OF MICTURITION: ICD-10-CM

## 2022-04-29 DIAGNOSIS — R39.89 BLADDER PAIN: ICD-10-CM

## 2022-04-29 DIAGNOSIS — R33.9 INCOMPLETE EMPTYING OF BLADDER: ICD-10-CM

## 2022-04-29 DIAGNOSIS — R31.0 GROSS HEMATURIA: Primary | ICD-10-CM

## 2022-04-29 DIAGNOSIS — N32.89 BLADDER WALL THICKENING: ICD-10-CM

## 2022-04-29 DIAGNOSIS — N20.0 LEFT NEPHROLITHIASIS: ICD-10-CM

## 2022-04-29 LAB
BILIRUB BLD-MCNC: NEGATIVE MG/DL
CLARITY, POC: CLEAR
COLOR UR: YELLOW
EXPIRATION DATE: ABNORMAL
GLUCOSE UR STRIP-MCNC: NEGATIVE MG/DL
KETONES UR QL: NEGATIVE
LEUKOCYTE EST, POC: NEGATIVE
Lab: ABNORMAL
NITRITE UR-MCNC: NEGATIVE MG/ML
PH UR: 5 [PH] (ref 5–8)
PROT UR STRIP-MCNC: NEGATIVE MG/DL
RBC # UR STRIP: ABNORMAL /UL
SP GR UR: 1.01 (ref 1–1.03)
UROBILINOGEN UR QL: NORMAL

## 2022-04-29 PROCEDURE — 99214 OFFICE O/P EST MOD 30 MIN: CPT | Performed by: NURSE PRACTITIONER

## 2022-04-29 PROCEDURE — 87086 URINE CULTURE/COLONY COUNT: CPT | Performed by: NURSE PRACTITIONER

## 2022-04-29 PROCEDURE — 81003 URINALYSIS AUTO W/O SCOPE: CPT | Performed by: NURSE PRACTITIONER

## 2022-04-29 RX ORDER — POLYETHYLENE GLYCOL 3350 17 G/17G
17 POWDER, FOR SOLUTION ORAL DAILY
Qty: 30 EACH | Refills: 3 | Status: SHIPPED | OUTPATIENT
Start: 2022-04-29 | End: 2022-09-12

## 2022-04-29 RX ORDER — PHENAZOPYRIDINE HYDROCHLORIDE 200 MG/1
200 TABLET, FILM COATED ORAL 3 TIMES DAILY PRN
Qty: 20 TABLET | Refills: 0 | Status: SHIPPED | OUTPATIENT
Start: 2022-04-29 | End: 2022-06-30 | Stop reason: SDUPTHER

## 2022-04-29 RX ORDER — SENNA PLUS 8.6 MG/1
1 TABLET ORAL DAILY
Qty: 30 TABLET | Refills: 5 | Status: SHIPPED | OUTPATIENT
Start: 2022-04-29 | End: 2022-09-12

## 2022-04-29 NOTE — PROGRESS NOTES
Chief Complaint  GROSS HEMATURIA/ACUTE CYSTITIS/ (NEW PATIENT WITH GROSS HEMATURIA / UTIs /KIDNEY STONES)    Noel Quinones presents to [facility] GASTROENTEROLOGY [facility] UROLOGY  History of Present Illness     MS ROSA QUINONES is a 46-year-old very pleasant female with recurrent kidney stone disease, WHO presents to clinic today for evaluation, with concerns of left flank pain.  Patient reports her pain has been intermittent, ongoing 2 weeks.  She reports significant left flank pain/discomfort that has been colicky in nature and constant 5/10.  She reports frequent episodes of nausea, no vomiting, she denies having any fevers or chills.    The patient is also an ER follow-up from 04/9/2022.  States she also presented to the ED with complaints of left lower quadrant abdominal pain and left flank pain.  She reports that symptoms started 04/08, and reports that she woke up with a fever, and her temperature was >100.  She reports she has had some dysuria.  She denied any other significant complaints or concerns.    WE also reviewed/discussed her CT abdomen and pelvis done at the ER which showed:Bladder: Extensive diffuse urinary bladder wall thickening,   Kidneys/ureters: Nonobstructing right intrarenal stone  GI tract: Moderate to large stool burden.  This is characterized by extreme amounts of constipation.  We spoke about the impact of this on bladder function.  We spoke about  its relationship to recurrent urinary tract infections, vaginal pain, pelvic/bladder pressure, back pain, flank pain she is experiencing.     She has had some,  urine frequency and urgency from probably drinking too much >20 soda drinks of Dr. Pepper a day She reports., she reports Many episodes of gross hematuria, and also reports Dysuria, burning on urination, bladder pressure and discomfort. Her urine dipstick today post antibiotic therapy with cefdinir is completely negative for any infection, She has trace  "microscopic hematuria.  PVR is 0.    She is a  and has had a complete hysterectomy at [facility], secondary to cervical cancer.  However patient continues to smoke 1-2 PPD x30+ years. She has a significant past medical history  for GERD, asthma, arthritis, and history of cervical cancer.     Objective   Vital Signs:   Ht 167.6 cm (65.98\")   Wt 68 kg (150 lb)   BMI 24.22 kg/m²     Physical Exam  Constitutional:       General: She is in acute distress.      Appearance: She is well-developed. She is ill-appearing.   HENT:      Head: Normocephalic and atraumatic.   Eyes:      Pupils: Pupils are equal, round, and reactive to light.   Neck:      Thyroid: No thyromegaly.      Trachea: No tracheal deviation.   Cardiovascular:      Rate and Rhythm: Normal rate and regular rhythm.      Heart sounds: No murmur heard.  Pulmonary:      Effort: Pulmonary effort is normal. No respiratory distress.      Breath sounds: Normal breath sounds. No stridor. No wheezing.   Abdominal:      General: Bowel sounds are normal. There is distension.      Palpations: Abdomen is soft.      Tenderness: There is abdominal tenderness. There is guarding.   Genitourinary:     Labia:         Right: No tenderness.         Left: No tenderness.       Vagina: Normal. No vaginal discharge.      Comments: Soft nontender abdomen with no organomegaly, rigidity, guarding or tenderness.  Normal vaginal orifice.  She has minimal leakage with Valsalva.  No significant perineal body abnormalities and a normal external anus.     Musculoskeletal:         General: No deformity. Normal range of motion.      Cervical back: Normal range of motion.   Skin:     General: Skin is warm and dry.      Capillary Refill: Capillary refill takes less than 2 seconds.      Coloration: Skin is pale.      Findings: No erythema or rash.   Neurological:      Mental Status: She is alert and oriented to person, place, and time.      Cranial Nerves: No cranial nerve deficit.      Sensory: No " sensory deficit.      Motor: Weakness present.      Coordination: Coordination normal.   Psychiatric:         Behavior: Behavior normal.         Thought Content: Thought content normal.         Judgment: Judgment normal.        Result Review :     Assessment and Plan    Diagnoses and all orders for this visit:    1. Gross hematuria (Primary)    2. Bladder wall thickening    3. Incomplete emptying of bladder  -     Bladder Scan    4. Frequency of micturition  -     POC Urinalysis Dipstick, Automated  -     Urine Culture - Urine, Urine, Clean Catch  -     phenazopyridine (Pyridium) 200 MG tablet; Take 1 tablet by mouth 3 (Three) Times a Day As Needed for Bladder Spasms.  Dispense: 20 tablet; Refill: 0    5. Bladder pain  -     phenazopyridine (Pyridium) 200 MG tablet; Take 1 tablet by mouth 3 (Three) Times a Day As Needed for Bladder Spasms.  Dispense: 20 tablet; Refill: 0    6. Slow transit constipation  -     polyethylene glycol (MiraLax) 17 g packet; Take 17 g by mouth Daily.  Dispense: 30 each; Refill: 3  -     senna (Senokot) 8.6 MG tablet; Take 1 tablet by mouth Daily.  Dispense: 30 tablet; Refill: 5    7. Left nephrolithiasis           ASSESSMENT   Gross hematuria/Recurrent UTIs/Nephrolithiasis//Bladder wall thickening (PT HAS HX OF CERVICAL CA, STILL 1-2PPD SMOKER)    Hematuria:  Discussed Microscopic VS Gross Hematuria with patient. SHe has been very symptomatic and has some gross hematuria 1 week ago. We discussed the possible causes such as infection in the bladder, kidney, or bladder discomfort, trauma, kidney stones, different kinds of cancers, vigorous exercise, viral illness, such as hepatitis a virus that causes liver disease and inflammation of the liver and sexual activity.    Recurrent urinary tract infections-patient has been referred and diagnosed with recurrent urinary tract infections.  We discussed the types of organisms that are found in the urinary tract indicating that the vast majority are  results of the patient's own gastrointestinal abdon.  We discussed how many of the antibiotics that are utilized can actually exacerbate these infections by creating resistant organisms and there is only a very few antibiotics that are concentrated in the urine and do not affect the rectal reservoir nor cause recurrent yeast vaginitis.      We discussed the risk factors for recurrent infections being intercourse in younger patients and atrophic changes in older patients.  We discussed the symptoms that are found including pain, pressure, burning, frequency, urgency suprapubic pain and painful intercourse.  I discussed upper tract symptoms including fevers, chills, and indicated the workup would be much more aggressive if the patient were to present with recurrent infections in the face of upper tract symptomatology such as fever.  I discussed the history of vesicoureteral reflux in young patients and finally chronic renal scarring as a result of such.     Bilateral Renal Calculus: SHe presents with colicky pain that has been intermittent occasionally sharp with pelvic pressure and discomfort ongoing. We have discussed the various parameters regarding spontaneous passage including the notion that a tiny 1-4 mm stone has a high likelihood of spontaneous passage versus a larger stone being caught up in the upper areas of the urinary tract. We discussed the absolute relative indicators for intervention including the presence of sepsis, and pain we cannot control as the primary need for urgent intervention.  We discussed placement of a stent if indicated and the management of the stent as well.        PLAN  We will resend her urine for culture, I will call her with results if any positive bacterial growth.    We discussed restarting antibiotic suppressive therapy with Macrobid, if any positive bacteria     I recommend concomitant probiotics with treatment with antibiotics to protect the rectal reservoir including  over-the-counter yogurt preparations to mt oral pills containing the appropriate probiotics.    Discussed a kidney stone prevention diet to include increasing p.o. fluid intake, to at least 1 to 2 L of water daily.  She is to avoid caffeine products such as cola, coffee, and to avoid soft or soda drinks.  She is to decrease her sodium consumption as in  Fast foods, gannon, salted nuts, canned foods, and smoked/cured foods. She is also to decrease her oxalate consumption, as in spinach, [place] greens, and Rhubarb.  Also important is to decrease protein intake, as in red meats, peanut butter, and also avoid nuts.     We discussed the use of both an upper and lower tract investigation.  I discussed the fact that an upper tract investigation includes a  CT scan with contrast being the gold standard to diagnose the small neoplasms-patient recent CT showed significant bladder wall thickening.    We discussed the lower tract investigation consisting of a cystoscopy and patient has been scheduled for cystoscopy with Dr. SANDOVAL 05/11/2022-Gross hematuria, bladder wall thickening, History of cervical cancer, patient continues to smoke 1-2 PPD    Follow-up with patient procedures, she may return sooner if need be    Patient is agreeable plan of care.    BMI is within normal parameters. No follow-up required.         Follow Up   Return in about 10 days (around 5/9/2022) for Next scheduled follow up, DR SANDOVAL, Cystoscopy, GROSS/MICRO HEMATURIA.  Patient was given instructions and counseling regarding her condition or for health maintenance advice. Please see specific information pulled into the AVS if appropriate.

## 2022-05-01 LAB — BACTERIA SPEC AEROBE CULT: NO GROWTH

## 2022-05-11 ENCOUNTER — OFFICE VISIT (OUTPATIENT)
Dept: GASTROENTEROLOGY | Facility: CLINIC | Age: 47
End: 2022-05-11

## 2022-05-11 ENCOUNTER — PROCEDURE VISIT (OUTPATIENT)
Dept: UROLOGY | Facility: CLINIC | Age: 47
End: 2022-05-11

## 2022-05-11 VITALS — HEIGHT: 66 IN | BODY MASS INDEX: 24.27 KG/M2 | WEIGHT: 151 LBS

## 2022-05-11 VITALS
DIASTOLIC BLOOD PRESSURE: 79 MMHG | HEIGHT: 66 IN | SYSTOLIC BLOOD PRESSURE: 115 MMHG | HEART RATE: 88 BPM | WEIGHT: 176.4 LBS | BODY MASS INDEX: 28.35 KG/M2

## 2022-05-11 DIAGNOSIS — R35.0 FREQUENCY OF MICTURITION: ICD-10-CM

## 2022-05-11 DIAGNOSIS — R14.0 BLOATING: ICD-10-CM

## 2022-05-11 DIAGNOSIS — K59.04 CHRONIC IDIOPATHIC CONSTIPATION: Primary | ICD-10-CM

## 2022-05-11 DIAGNOSIS — R10.84 GENERALIZED ABDOMINAL PAIN: ICD-10-CM

## 2022-05-11 DIAGNOSIS — R33.9 INCOMPLETE EMPTYING OF BLADDER: ICD-10-CM

## 2022-05-11 DIAGNOSIS — R31.0 GROSS HEMATURIA: Primary | ICD-10-CM

## 2022-05-11 PROCEDURE — 99214 OFFICE O/P EST MOD 30 MIN: CPT | Performed by: PHYSICIAN ASSISTANT

## 2022-05-11 PROCEDURE — 81003 URINALYSIS AUTO W/O SCOPE: CPT | Performed by: UROLOGY

## 2022-05-11 PROCEDURE — 52000 CYSTOURETHROSCOPY: CPT | Performed by: UROLOGY

## 2022-05-11 PROCEDURE — 51798 US URINE CAPACITY MEASURE: CPT | Performed by: UROLOGY

## 2022-05-11 NOTE — PROGRESS NOTES
Chief Complaint   Patient presents with   • Constipation       Vicki Dietrich is a 46 y.o. female who presents to the office today for evaluation of Constipation  .    HPI  Patient presents to the clinic for evaluation of chronic constipation.  She was referred to us by urology who is currently treating her for UTI/kidney stones.  Patient had a CT scan performed on 4/8 that showed a moderate to large stool burden.  Patient states that she is only having 1-2 bowel movements monthly that range from type I-II on the Casey stool scale.  Patient states she does not feel like she evacuates her colon well.  Most bowel movements are small volume.  Patient denies having any breakthrough diarrhea in between formed bowel movements.  She does have some abdominal pain and bloating noted in between bowel movements.  Review of Systems   Constitutional: Negative for fever.   HENT: Negative for trouble swallowing.    Eyes: Negative.    Respiratory: Negative.    Cardiovascular: Negative.    Gastrointestinal: Positive for abdominal distention, abdominal pain and constipation. Negative for anal bleeding, blood in stool, diarrhea, nausea and vomiting.   Endocrine: Negative.    Genitourinary: Positive for difficulty urinating.   Musculoskeletal: Negative.    Skin: Negative.    Allergic/Immunologic: Negative.    Neurological: Positive for headaches.   Hematological: Does not bruise/bleed easily.   Psychiatric/Behavioral: Negative.        ACTIVE PROBLEMS:   Specialty Problems    None         PAST MEDICAL HISTORY:  Past Medical History:   Diagnosis Date   • Abnormal uterine bleeding (AUB)    • Acid reflux    • Arthritis    • Asthma     AS A CHILD   • Back injury    • Cancer (HCC)     surgery to remove cervical cancer   • Cervical cancer (HCC)    • Endometriosis        SURGICAL HISTORY:  Past Surgical History:   Procedure Laterality Date   • CERVIX SURGERY     • DILATATION AND CURETTAGE     • TOTAL LAPAROSCOPIC HYSTERECTOMY N/A 6/2/2020     Procedure: TOTAL LAPAROSCOPIC HYSTERECTOMY BILATERAL SALPINGECTOMY OOPHORECTOMY WITH DAVINCI ROBOT, CYSTOSCOPY;  Surgeon: Myrna Rojas DO;  Location: [facility];  Service: [facility];  Laterality: N/A;  Robot docked at 0815, undocked at 0915   • TUBAL ABDOMINAL LIGATION         FAMILY HISTORY:  Family History   Problem Relation Age of Onset   • Cirrhosis Mother    • Diabetes Mother    • Heart failure Mother    • Heart failure Father        SOCIAL HISTORY:  Social History     Tobacco Use   • Smoking status: Current Every Day Smoker     Packs/day: 1.50     Years: 30.00     Pack years: 45.00     Types: Cigarettes   • Smokeless tobacco: Never Used   Substance Use Topics   • Alcohol use: No       CURRENT MEDICATION:    Current Outpatient Medications:   •  albuterol sulfate  (90 Base) MCG/ACT inhaler, Inhale 2 puffs Every 6 (Six) Hours As Needed for Wheezing or Shortness of Air., Disp: 8.5 g, Rfl: 1  •  cefdinir (OMNICEF) 300 MG capsule, Take 1 capsule by mouth 2 (Two) Times a Day., Disp: 20 capsule, Rfl: 0  •  diclofenac (VOLTAREN) 50 MG EC tablet, Take 1 tablet by mouth 3 (Three) Times a Day As Needed (pain)., Disp: 20 tablet, Rfl: 0  •  ondansetron (ZOFRAN) 4 MG tablet, Take 1 tablet by mouth Every 6 (Six) Hours As Needed for Nausea or Vomiting., Disp: 15 tablet, Rfl: 0  •  pantoprazole (PROTONIX) 40 MG EC tablet, Take 1 tablet by mouth Daily., Disp: 30 tablet, Rfl: 0  •  phenazopyridine (Pyridium) 200 MG tablet, Take 1 tablet by mouth 3 (Three) Times a Day As Needed for Bladder Spasms., Disp: 20 tablet, Rfl: 0  •  polyethylene glycol (MiraLax) 17 g packet, Take 17 g by mouth Daily., Disp: 30 each, Rfl: 3  •  senna (Senokot) 8.6 MG tablet, Take 1 tablet by mouth Daily., Disp: 30 tablet, Rfl: 5  •  linaclotide (LINZESS) 72 MCG capsule capsule, Take 1 capsule by mouth Every Morning Before Breakfast., Disp: 30 capsule, Rfl: 11    ALLERGIES:  Codeine    VISIT VITALS:  /79 (BP Location: Left arm, Patient  "Position: Sitting, Cuff Size: Adult)   Pulse 88   Ht 167.6 cm (66\")   Wt 80 kg (176 lb 6.4 oz)   LMP 04/24/2020   BMI 28.47 kg/m²   Physical Exam  Constitutional:       General: She is not in acute distress.     Appearance: Normal appearance. She is well-developed.   HENT:      Head: Normocephalic and atraumatic.   Eyes:      Pupils: Pupils are equal, round, and reactive to light.   Cardiovascular:      Rate and Rhythm: Normal rate and regular rhythm.      Heart sounds: Normal heart sounds.   Pulmonary:      Effort: Pulmonary effort is normal. No respiratory distress.      Breath sounds: Normal breath sounds. No wheezing, rhonchi or rales.   Abdominal:      General: Abdomen is flat. Bowel sounds are normal. There is no distension.      Palpations: Abdomen is soft. There is no mass.      Tenderness: There is no abdominal tenderness. There is no guarding or rebound.      Hernia: No hernia is present.   Musculoskeletal:         General: No swelling. Normal range of motion.      Cervical back: Normal range of motion and neck supple.      Right lower leg: No edema.      Left lower leg: No edema.   Skin:     General: Skin is warm and dry.   Neurological:      Mental Status: She is alert and oriented to person, place, and time.   Psychiatric:         Attention and Perception: Attention normal.         Mood and Affect: Mood normal.         Speech: Speech normal.         Behavior: Behavior normal. Behavior is cooperative.         Thought Content: Thought content normal.         Assessment    Due to patient's symptoms-I will go ahead and get her started on Linzess 72 mcg to help with constipation.  Patient was instructed to contact the clinic if Linzess dosage does not help produce a bowel movement and we will increase to 145 mcg.  She is to discontinue use of MiraLAX as well as Senokot given by urology.   Diagnosis Plan   1. Chronic idiopathic constipation  linaclotide (LINZESS) 72 MCG capsule capsule   2. Generalized " abdominal pain     3. Bloating         Return in about 6 weeks (around 6/22/2022).                   This document has been electronically signed by Heidi Sanchez PA-C  May 15, 2022 22:08 EDT    Part of this note may be an electronic transcription/translation of spoken language to printed text using the Dragon Dictation System.

## 2022-05-11 NOTE — PROGRESS NOTES
Chief Complaint:       Gross hematuria        HPI:       Ms. Dietrich is a 46-year-old lady who is here today for cystoscopy.  She does have a history of smoking and had gross hematuria.  She underwent a CT scan that showed a thickened bladder wall.  She reports that she has had 4 children vaginally without any complications.  However, she does leak and use pads.  She states that she leaks when she coughs, sneezes, or lifts anything heavy.  On exam today she does have a cystocele.  I advised her that this is something that could be repaired and she is interested in possibly having this done.    Procedure:      Procedure: Cystoscopy Female    Indication: Gross hematuria.     Urinalysis Performed Today:  Negative for Infection    Informed Consent Obtained    Sterile prep performed in usual fashion    6 cc of topical lidocaine inserted urethrally    Flexible cystoscope inserted and bladder examined    Findings: normal: Urethra without lesions, Bladder mucosa without tumors or lesions, No stones seen, ureteral orifices are in orthotopic position and size.    Additional Procedure with Cystoscopy: none      Discussion:      Ms. Dietrich is a 46-year-old lady with a history of gross hematuria.  Based on her CT scan and exam she is likely not emptying her bladder well.  This can be due to the possibly her prolapse.  She did not have any lesions on cystoscopy but she did have significant trabeculations.  I will obtain a CT urogram to complete her hematuria work-up.  I will have her see Rhys in approximately 6 weeks.  I instructed her to discuss the possibility of going to [facility] to have her prolapse addressed and she will think about this.  If she is agreeable we will refer her to .    Assessment:     Encounter Diagnoses   Name Primary?   • Gross hematuria Yes   • Frequency of micturition      Orders Placed This Encounter   Procedures   • CT Abdomen Pelvis With & Without Contrast     CT Urogram with non-contrast, renal  cortical, and delayed phases please.     Standing Status:   Future     Standing Expiration Date:   5/11/2023     Scheduling Instructions:      CT Urogram with non-contrast, renal cortical, and delayed phases please.     Order Specific Question:   Will Oral Contrast be needed for this procedure?     Answer:   No     Order Specific Question:   Patient Pregnant     Answer:   No     Order Specific Question:   Release to patient     Answer:   Immediate   • POC Urinalysis Dipstick, Automated     Order Specific Question:   Release to patient     Answer:   Immediate     NOW@

## 2022-06-30 ENCOUNTER — HOSPITAL ENCOUNTER (OUTPATIENT)
Dept: GENERAL RADIOLOGY | Facility: HOSPITAL | Age: 47
Discharge: HOME | End: 2022-06-30

## 2022-06-30 ENCOUNTER — OFFICE VISIT (OUTPATIENT)
Dept: UROLOGY | Facility: CLINIC | Age: 47
End: 2022-06-30

## 2022-06-30 ENCOUNTER — HOSPITAL ENCOUNTER (OUTPATIENT)
Dept: CT IMAGING | Facility: HOSPITAL | Age: 47
Discharge: HOME | End: 2022-06-30

## 2022-06-30 VITALS — HEIGHT: 66 IN | WEIGHT: 176 LBS | BODY MASS INDEX: 28.28 KG/M2

## 2022-06-30 DIAGNOSIS — N81.4 CYSTOCELE WITH PROLAPSE: ICD-10-CM

## 2022-06-30 DIAGNOSIS — R35.0 FREQUENCY OF MICTURITION: ICD-10-CM

## 2022-06-30 DIAGNOSIS — N30.01 ACUTE CYSTITIS WITH HEMATURIA: Primary | ICD-10-CM

## 2022-06-30 DIAGNOSIS — R39.89 BLADDER PAIN: ICD-10-CM

## 2022-06-30 DIAGNOSIS — R10.9 FLANK PAIN: ICD-10-CM

## 2022-06-30 DIAGNOSIS — R31.0 GROSS HEMATURIA: ICD-10-CM

## 2022-06-30 LAB
BILIRUB BLD-MCNC: NEGATIVE MG/DL
CLARITY, POC: CLEAR
COLOR UR: YELLOW
EXPIRATION DATE: NORMAL
GLUCOSE UR STRIP-MCNC: NEGATIVE MG/DL
KETONES UR QL: NEGATIVE
LEUKOCYTE EST, POC: NEGATIVE
Lab: NORMAL
NITRITE UR-MCNC: NEGATIVE MG/ML
PH UR: 6 [PH] (ref 5–8)
PROT UR STRIP-MCNC: NEGATIVE MG/DL
RBC # UR STRIP: NEGATIVE /UL
SP GR UR: 1.01 (ref 1–1.03)
UROBILINOGEN UR QL: NORMAL

## 2022-06-30 PROCEDURE — 96372 THER/PROPH/DIAG INJ SC/IM: CPT | Performed by: NURSE PRACTITIONER

## 2022-06-30 PROCEDURE — 74178 CT ABD&PLV WO CNTR FLWD CNTR: CPT | Performed by: RADIOLOGY

## 2022-06-30 PROCEDURE — 74178 CT ABD&PLV WO CNTR FLWD CNTR: CPT

## 2022-06-30 PROCEDURE — 87086 URINE CULTURE/COLONY COUNT: CPT | Performed by: NURSE PRACTITIONER

## 2022-06-30 PROCEDURE — 81003 URINALYSIS AUTO W/O SCOPE: CPT | Performed by: NURSE PRACTITIONER

## 2022-06-30 PROCEDURE — 74018 RADEX ABDOMEN 1 VIEW: CPT

## 2022-06-30 PROCEDURE — 99214 OFFICE O/P EST MOD 30 MIN: CPT | Performed by: NURSE PRACTITIONER

## 2022-06-30 PROCEDURE — 25010000002 IOPAMIDOL 61 % SOLUTION: Performed by: UROLOGY

## 2022-06-30 PROCEDURE — 74018 RADEX ABDOMEN 1 VIEW: CPT | Performed by: RADIOLOGY

## 2022-06-30 RX ORDER — KETOROLAC TROMETHAMINE 30 MG/ML
60 INJECTION, SOLUTION INTRAMUSCULAR; INTRAVENOUS ONCE
Status: COMPLETED | OUTPATIENT
Start: 2022-06-30 | End: 2022-06-30

## 2022-06-30 RX ORDER — CYCLOBENZAPRINE HCL 5 MG
5 TABLET ORAL EVERY 8 HOURS PRN
Qty: 20 TABLET | Refills: 1 | Status: SHIPPED | OUTPATIENT
Start: 2022-06-30 | End: 2022-09-12

## 2022-06-30 RX ORDER — PHENAZOPYRIDINE HYDROCHLORIDE 200 MG/1
200 TABLET, FILM COATED ORAL 3 TIMES DAILY PRN
Qty: 20 TABLET | Refills: 0 | Status: SHIPPED | OUTPATIENT
Start: 2022-06-30 | End: 2022-09-12

## 2022-06-30 RX ORDER — ONDANSETRON 4 MG/1
4 TABLET, FILM COATED ORAL EVERY 6 HOURS PRN
Qty: 15 TABLET | Refills: 0 | Status: SHIPPED | OUTPATIENT
Start: 2022-06-30 | End: 2022-06-30 | Stop reason: SDUPTHER

## 2022-06-30 RX ORDER — ONDANSETRON 4 MG/1
4 TABLET, FILM COATED ORAL EVERY 6 HOURS PRN
Qty: 15 TABLET | Refills: 0 | Status: SHIPPED | OUTPATIENT
Start: 2022-06-30 | End: 2022-09-12

## 2022-06-30 RX ORDER — KETOROLAC TROMETHAMINE 10 MG/1
10 TABLET, FILM COATED ORAL EVERY 6 HOURS PRN
Qty: 16 TABLET | Refills: 0 | Status: SHIPPED | OUTPATIENT
Start: 2022-06-30 | End: 2022-09-12

## 2022-06-30 RX ADMIN — KETOROLAC TROMETHAMINE 60 MG: 30 INJECTION, SOLUTION INTRAMUSCULAR; INTRAVENOUS at 10:45

## 2022-06-30 RX ADMIN — IOPAMIDOL 100 ML: 612 INJECTION, SOLUTION INTRAVENOUS at 10:25

## 2022-06-30 NOTE — PROGRESS NOTES
Chief Complaint  HEMATURIA/BLADDER PAIN/FLANK PAIN/ACUTE CYSITITIS (FOLLOW UP FLANK PAIN/CYSTOCELE)    Noel Quinones presents to [facility]  History of Present Illness     MS ROSA QUINONES is a 46-year-old very pleasant female with acute cystitis with hematuria, flank pain, recurrent kidney stone disease-resolved, detrusor instability WHO  return to clinic today for evaluation, with concerns of left flank pain.      This is a 6 weeks follow-up, Patient reports her pain has been intermittent, sometimes constant 7 out of 10. She reports significant left flank pain/discomfort that has been colicky in nature, sometimes bladder pain, lower back pain, with worsening episodes of incontinence.  She reports frequent episodes of nausea, no vomiting, she denies having any fevers or chills.  She continues to have microscopic hematuria with her urine dipstick, but denies any recent episodes of gross hematuria.    She had a remarkable cystoscopy with Dr. SANDOVAL  on 05/11/2022 which showed a grade 3 cystocele. PER DR. SANDOVAL, She does have a history of smoking and had gross hematuria.  She underwent a CT scan that showed a thickened bladder wall.  She reports that she has had 4 children vaginally without any complications.  However, she does leak and use pads.  She states that she leaks when she coughs, sneezes, or lifts anything heavy.    Otherwise findings: normal: Urethra without lesions, Bladder mucosa without tumors or lesions, No stones seen, ureteral orifices are in orthotopic position and size.     Patient reports she is ready to get her cystoscopy report prior and will like referral today.  She previously saw Dr. Collins at [facility] and wanted to follow-up with her.  She has been scheduled for 08/02/2022. Her urine dipstick today post antibiotic therapy with cefdinir is completely negative for any infection, She has trace microscopic hematuria.  PVR is  "0.    We also reviewed/discussed her repeat KUB today which showed that: There are no calcifications are demonstrable overlying the kidneys. The stones seen in the right kidney are not identifiable, possibly due to overlying gas and stool in the bowel. No soft tissue masses were noted. In the pelvis, there are calcifications on the right consistent with phleboliths.  IMPRESSION:  A kidney stone is not demonstrable on the KUB.    GASTROINTESTINAL TRACT: Abundant distal colonic stool.  This is characterized by extreme amounts of constipation.  We spoke about the impact of this on bladder function.  We spoke about  its relationship to recurrent urinary tract infections, vaginal pain, pelvic/bladder pressure, back pain, flank pain she is experiencing.     Patient follow-up with GI as recommended for adequate bowel control.    She is a  and has had a complete hysterectomy at [facility], secondary to cervical cancer.  However patient continues to smoke 1-2 PPD x30+ years. She has a significant past medical history  for GERD, asthma, arthritis, and history of cervical cancer.      Objective   Vital Signs:  Ht 167.6 cm (66\")   Wt 79.8 kg (176 lb)   BMI 28.41 kg/m²   Estimated body mass index is 28.41 kg/m² as calculated from the following:    Height as of this encounter: 167.6 cm (66\").    Weight as of this encounter: 79.8 kg (176 lb).    BMI is >= 25 and <30. (Overweight) The following options were offered after discussion;: weight loss educational material (shared in after visit summary), exercise counseling/recommendations and nutrition counseling/recommendations      Physical Exam  Constitutional:       General: She is in acute distress.      Appearance: She is well-developed. She is obese.   HENT:      Head: Normocephalic and atraumatic.   Eyes:      Pupils: Pupils are equal, round, and reactive to light.   Neck:      Thyroid: No thyromegaly.      Trachea: No tracheal deviation.   Cardiovascular:      Rate and Rhythm: " Normal rate and regular rhythm.      Heart sounds: No murmur heard.  Pulmonary:      Effort: Pulmonary effort is normal. No respiratory distress.      Breath sounds: Normal breath sounds. No stridor. No wheezing.   Abdominal:      General: Bowel sounds are normal. There is distension.      Palpations: Abdomen is soft.      Tenderness: There is abdominal tenderness. There is guarding and rebound.   Genitourinary:     Labia:         Right: No tenderness.         Left: No tenderness.       Vagina: Normal. No vaginal discharge.      Comments: Soft nontender abdomen with no organomegaly, rigidity, guarding or tenderness.  Normal vaginal orifice.  She has moderate leakage with Valsalva.  With significant perineal body abnormalities and a normal external anus.  Grade 3 cystocele, patient requesting OB/GYN eval    Musculoskeletal:         General: No deformity. Normal range of motion.      Cervical back: Normal range of motion.   Skin:     General: Skin is warm and dry.      Capillary Refill: Capillary refill takes less than 2 seconds.      Coloration: Skin is pale.      Findings: No erythema or rash.   Neurological:      Mental Status: She is alert and oriented to person, place, and time.      Cranial Nerves: No cranial nerve deficit.      Sensory: No sensory deficit.      Motor: Weakness present.      Coordination: Coordination normal.   Psychiatric:         Behavior: Behavior normal.         Thought Content: Thought content normal.         Judgment: Judgment normal.        Result Review :    Assessment and Plan   Diagnoses and all orders for this visit:    1. Acute cystitis with hematuria (Primary)  -     ketorolac (TORADOL) 10 MG tablet; Take 1 tablet by mouth Every 6 (Six) Hours As Needed for Moderate Pain .  Dispense: 16 tablet; Refill: 0  -     phenazopyridine (Pyridium) 200 MG tablet; Take 1 tablet by mouth 3 (Three) Times a Day As Needed for Bladder Spasms.  Dispense: 20 tablet; Refill: 0  -     Discontinue:  ondansetron (ZOFRAN) 4 MG tablet; Take 1 tablet by mouth Every 6 (Six) Hours As Needed for Nausea or Vomiting.  Dispense: 15 tablet; Refill: 0  -     Ambulatory Referral to Obstetrics / Gynecology  -     ondansetron (ZOFRAN) 4 MG tablet; Take 1 tablet by mouth Every 6 (Six) Hours As Needed for Nausea or Vomiting.  Dispense: 15 tablet; Refill: 0    2. Cystocele with prolapse  -     Ambulatory Referral to Obstetrics / Gynecology    3. Bladder pain  -     ketorolac (TORADOL) 10 MG tablet; Take 1 tablet by mouth Every 6 (Six) Hours As Needed for Moderate Pain .  Dispense: 16 tablet; Refill: 0  -     phenazopyridine (Pyridium) 200 MG tablet; Take 1 tablet by mouth 3 (Three) Times a Day As Needed for Bladder Spasms.  Dispense: 20 tablet; Refill: 0  -     Discontinue: ondansetron (ZOFRAN) 4 MG tablet; Take 1 tablet by mouth Every 6 (Six) Hours As Needed for Nausea or Vomiting.  Dispense: 15 tablet; Refill: 0  -     Ambulatory Referral to Obstetrics / Gynecology  -     ondansetron (ZOFRAN) 4 MG tablet; Take 1 tablet by mouth Every 6 (Six) Hours As Needed for Nausea or Vomiting.  Dispense: 15 tablet; Refill: 0    4. Flank pain  -     XR Abdomen KUB  -     POC Urinalysis Dipstick, Automated  -     ketorolac (TORADOL) 10 MG tablet; Take 1 tablet by mouth Every 6 (Six) Hours As Needed for Moderate Pain .  Dispense: 16 tablet; Refill: 0  -     phenazopyridine (Pyridium) 200 MG tablet; Take 1 tablet by mouth 3 (Three) Times a Day As Needed for Bladder Spasms.  Dispense: 20 tablet; Refill: 0  -     Discontinue: ondansetron (ZOFRAN) 4 MG tablet; Take 1 tablet by mouth Every 6 (Six) Hours As Needed for Nausea or Vomiting.  Dispense: 15 tablet; Refill: 0  -     cyclobenzaprine (FLEXERIL) 5 MG tablet; Take 1 tablet by mouth Every 8 (Eight) Hours As Needed for Muscle Spasms. AND LEFT FLANK PAIN  Dispense: 20 tablet; Refill: 1  -     Ambulatory Referral to Obstetrics / Gynecology  -     ondansetron (ZOFRAN) 4 MG tablet; Take 1 tablet by  mouth Every 6 (Six) Hours As Needed for Nausea or Vomiting.  Dispense: 15 tablet; Refill: 0  -     ketorolac (TORADOL) injection 60 mg    5. Frequency of micturition  -     ketorolac (TORADOL) 10 MG tablet; Take 1 tablet by mouth Every 6 (Six) Hours As Needed for Moderate Pain .  Dispense: 16 tablet; Refill: 0  -     phenazopyridine (Pyridium) 200 MG tablet; Take 1 tablet by mouth 3 (Three) Times a Day As Needed for Bladder Spasms.  Dispense: 20 tablet; Refill: 0  -     Discontinue: ondansetron (ZOFRAN) 4 MG tablet; Take 1 tablet by mouth Every 6 (Six) Hours As Needed for Nausea or Vomiting.  Dispense: 15 tablet; Refill: 0  -     ondansetron (ZOFRAN) 4 MG tablet; Take 1 tablet by mouth Every 6 (Six) Hours As Needed for Nausea or Vomiting.  Dispense: 15 tablet; Refill: 0  -     Urine Culture - Urine, Urine, Clean Catch                                         ASSESSMENT   Gross hematuria/Recurrent UTIs/Nephrolithiasis//Bladder wall thickening (PT HAS HX OF CERVICAL CA, STILL 1-2PPD SMOKER)  MS ROSA QUINONES is a 46-year-old very pleasant female with acute cystitis with hematuria, flank pain, recurrent kidney stone disease-resolved, detrusor instability, grade 3 cystocele WHO  returns to clinic today for evaluation, with concerns of left flank pain.  This is a 6 weeks follow-up post lower tract investigation via cystoscopy with .    She is in NO apparent distress besides her left flank pain, but has cystoscopy reports she i she would like to proceed with a cystoscopy evaluation.  KUB is negative for any obstructing stones, she has significant stool burden consistent with constipation.  Urine dipstick is completely negative for infection, shows trace microscopic hematuria.    Hematuria:  Discussed Microscopic VS Gross Hematuria with patient. SHe has been  Asymptomatic and had some gross hematuria over 6 weeks ago. We discussed the possible causes such as infection in the bladder, kidney, or bladder discomfort,  trauma, kidney stones, different kinds of cancers, vigorous exercise, viral illness, such as hepatitis a virus that causes liver disease and inflammation of the liver and sexual activity.     Recurrent urinary tract infections-patient has been referred and diagnosed with recurrent urinary tract infections.  We discussed the types of organisms that are found in the urinary tract indicating that the vast majority are results of the patient's own gastrointestinal abdon.  We discussed how many of the antibiotics that are utilized can actually exacerbate these infections by creating resistant organisms and there is only a very few antibiotics that are concentrated in the urine and do not affect the rectal reservoir nor cause recurrent yeast vaginitis.       We discussed the risk factors for recurrent infections being intercourse in younger patients and atrophic changes in older patients.  We discussed the symptoms that are found including pain, pressure, burning, frequency, urgency suprapubic pain and painful intercourse.  I discussed upper tract symptoms including fevers, chills, and indicated the workup would be much more aggressive if the patient were to present with recurrent infections in the face of upper tract symptomatology such as fever.  I discussed the history of vesicoureteral reflux in young patients and finally chronic renal scarring as a result of such.      Bilateral Renal Calculus: SHe presents with colicky pain that has been intermittent occasionally sharp with pelvic pressure and discomfort ongoing. We have discussed the various parameters regarding spontaneous passage including the notion that a tiny 1-4 mm stone has a high likelihood of spontaneous passage versus a larger stone being caught up in the upper areas of the urinary tract. We discussed the absolute relative indicators for intervention including the presence of sepsis, and pain we cannot control as the primary need for urgent intervention.   We discussed placement of a stent if indicated and the management of the stent as well.                                          PLAN  We will resend her urine for culture, I will call her with results if any positive bacterial growth.     We discussed restarting antibiotic suppressive therapy with Macrobid, if any positive bacteria      I recommend concomitant probiotics with treatment with antibiotics to protect the rectal reservoir including over-the-counter yogurt preparations to mt oral pills containing the appropriate probiotics.    Discussed a kidney stone prevention diet to include increasing p.o. fluid intake, to at least 1 to 2 L of water daily.  Patient reports decreasing in consumption of any soda drinks. She is to avoid caffeine products such as cola, coffee, and to avoid soft or soda drinks.  She is to decrease her sodium consumption as in  Fast foods, gannon, salted nuts, canned foods, and smoked/cured foods. She is also to decrease her oxalate consumption, as in spinach, [place] greens, and Rhubarb.  Also important is to decrease protein intake, as in red meats, peanut butter, and also avoid nuts.     SHE Has been referred to OB/GYN Dr. Collins at [facility] for Cystoscopy evaluation and repair.    Follow-up with patient post procedures, she may return sooner if need be     Patient is agreeable plan of care.     Follow Up   Return in about 3 months (around 9/30/2022) for Next scheduled follow up, RECURRENT UTI/DYSURIA/DETRUSSOR INSTABILITY, GROSS/MICRO HEMATURIA/CYSTOCE.  Patient was given instructions and counseling regarding her condition or for health maintenance advice. Please see specific information pulled into the AVS if appropriate.

## 2022-07-01 LAB — BACTERIA SPEC AEROBE CULT: NO GROWTH

## 2022-07-06 ENCOUNTER — TELEPHONE (OUTPATIENT)
Dept: UROLOGY | Facility: CLINIC | Age: 47
End: 2022-07-06

## 2022-07-06 NOTE — TELEPHONE ENCOUNTER
Patient called requesting a letter stating she can not be lifting, tugging or pulling at work due to her bladder dropping until she goes to see the ob/gyn on 8/2/22.

## 2022-08-03 ENCOUNTER — TRANSCRIBE ORDERS (OUTPATIENT)
Dept: LAB | Facility: HOSPITAL | Age: 47
End: 2022-08-03

## 2022-08-03 DIAGNOSIS — Z01.818 OTHER SPECIFIED PRE-OPERATIVE EXAMINATION: Primary | ICD-10-CM

## 2022-09-11 ENCOUNTER — PREP FOR SURGERY (OUTPATIENT)
Dept: OTHER | Facility: HOSPITAL | Age: 47
End: 2022-09-11

## 2022-09-11 DIAGNOSIS — N39.3 SUI (STRESS URINARY INCONTINENCE, FEMALE): Primary | ICD-10-CM

## 2022-09-11 RX ORDER — SODIUM CHLORIDE 0.9 % (FLUSH) 0.9 %
10 SYRINGE (ML) INJECTION EVERY 12 HOURS SCHEDULED
Status: CANCELLED | OUTPATIENT
Start: 2022-09-14

## 2022-09-11 RX ORDER — SODIUM CHLORIDE 0.9 % (FLUSH) 0.9 %
10 SYRINGE (ML) INJECTION AS NEEDED
Status: CANCELLED | OUTPATIENT
Start: 2022-09-14

## 2022-09-12 ENCOUNTER — PRE-ADMISSION TESTING (OUTPATIENT)
Dept: PREADMISSION TESTING | Facility: HOSPITAL | Age: 47
End: 2022-09-12

## 2022-09-12 DIAGNOSIS — N39.3 SUI (STRESS URINARY INCONTINENCE, FEMALE): ICD-10-CM

## 2022-09-12 LAB
ABO GROUP BLD: NORMAL
BASOPHILS # BLD AUTO: 0.06 10*3/MM3 (ref 0–0.2)
BASOPHILS NFR BLD AUTO: 0.7 % (ref 0–1.5)
BLD GP AB SCN SERPL QL: NEGATIVE
DEPRECATED RDW RBC AUTO: 41.9 FL (ref 37–54)
EOSINOPHIL # BLD AUTO: 0.24 10*3/MM3 (ref 0–0.4)
EOSINOPHIL NFR BLD AUTO: 3 % (ref 0.3–6.2)
ERYTHROCYTE [DISTWIDTH] IN BLOOD BY AUTOMATED COUNT: 12.2 % (ref 12.3–15.4)
HCT VFR BLD AUTO: 46.4 % (ref 34–46.6)
HGB BLD-MCNC: 15.4 G/DL (ref 12–15.9)
IMM GRANULOCYTES # BLD AUTO: 0.01 10*3/MM3 (ref 0–0.05)
IMM GRANULOCYTES NFR BLD AUTO: 0.1 % (ref 0–0.5)
LYMPHOCYTES # BLD AUTO: 3.21 10*3/MM3 (ref 0.7–3.1)
LYMPHOCYTES NFR BLD AUTO: 39.9 % (ref 19.6–45.3)
MCH RBC QN AUTO: 31 PG (ref 26.6–33)
MCHC RBC AUTO-ENTMCNC: 33.2 G/DL (ref 31.5–35.7)
MCV RBC AUTO: 93.4 FL (ref 79–97)
MONOCYTES # BLD AUTO: 0.6 10*3/MM3 (ref 0.1–0.9)
MONOCYTES NFR BLD AUTO: 7.5 % (ref 5–12)
NEUTROPHILS NFR BLD AUTO: 3.93 10*3/MM3 (ref 1.7–7)
NEUTROPHILS NFR BLD AUTO: 48.8 % (ref 42.7–76)
NRBC BLD AUTO-RTO: 0 /100 WBC (ref 0–0.2)
PLATELET # BLD AUTO: 228 10*3/MM3 (ref 140–450)
PMV BLD AUTO: 10.8 FL (ref 6–12)
RBC # BLD AUTO: 4.97 10*6/MM3 (ref 3.77–5.28)
RH BLD: POSITIVE
T&S EXPIRATION DATE: NORMAL
WBC NRBC COR # BLD: 8.05 10*3/MM3 (ref 3.4–10.8)

## 2022-09-12 PROCEDURE — 86850 RBC ANTIBODY SCREEN: CPT

## 2022-09-12 PROCEDURE — 86900 BLOOD TYPING SEROLOGIC ABO: CPT

## 2022-09-12 PROCEDURE — 86901 BLOOD TYPING SEROLOGIC RH(D): CPT

## 2022-09-12 PROCEDURE — 36415 COLL VENOUS BLD VENIPUNCTURE: CPT

## 2022-09-12 PROCEDURE — 85025 COMPLETE CBC W/AUTO DIFF WBC: CPT

## 2022-09-12 NOTE — DISCHARGE INSTRUCTIONS
TAKE the following medications the morning of surgery:    Please discontinue all blood thinners and anticoagulants (except aspirin) prior to surgery as per your surgeon and cardiologist instructions.  Aspirin may be continued up to the day prior to surgery.    HOLD all diabetic medications the morning of surgery as order by physician.    Please follow instructions on use of prep cloths provided by nurse. Return instruction sheet to pre-op nurse on day of surgery.    Arrival time for surgery on   will be given to you by Dr. Busby.    A RESPONSIBLE PERSON MUST REMAIN IN THE WAITING ROOM DURING YOUR PROCEDURE AND A RESPONSIBLE  MUST BE AVAILABLE UPON YOUR DISCHARGE.    General Instructions:  Do NOT eat or drink after midnight which includes water, mints, or gum.  You may brush your teeth. Dental appliances that are removable must be taken out day of surgery.  Do NOT smoke, chew tobacco, or drink alcohol within 24 hours prior to surgery.  Bring medications in original bottles, any inhalers and if applicable your C-PAP/BI-PAP machine  Bring any papers given to you in the doctor’s office  Wear clean, comfortable clothes and socks  Do NOT wear contact lenses or make-up or dark nail polish.  Bring a case for your glasses if applicable.  Bring crutches or walker if applicable  Leave all other valuables and jewelry at home  If you were given a blood bank armband, continue to wear it until discharged.    Preventing a Surgical Site Infection:  Shower the night before surgery (unless instructed otherwise) using a fresh bar of anti-bacterial soap (such as Dial) and clean washcloth.  Dry with a clean towel and dress in clean clothing.  For 2 to 3 days before surgery, avoid shaving with a razor near where you will have surgery because the razor can irritate skin and make it easier to develop an infection.  Ask your surgeon if you will be receiving antibiotics prior to surgery.  Make sure you, your family, and all  healthcare providers clean their hands with soap and water or an alcohol-based hand  before caring for you or your wound.  If at all possible, quit smoking as many days before surgery as you can.    Day of Surgery:  Upon arrival, a pre-op nurse and anesthesiologist will review your health history, obtain vital signs, and answer questions you may have.  The only belongings needed at this time will be your home medications and if applicable you C-PAP/BI-PAP machine.  If you are staying overnight, your family can leave the rest of your belongings in the car and bring them to your room later.  A pre-op nurse will start an IV and you may receive medication in preparation for surgery.  Due to patient privacy and limited space, only one member of your family will be able to accompany you in the pre-op area.  While you are in surgery your family should notify the waiting room  if they leave the waiting room area and provide a contact number.  Please be aware that surgery does come with discomfort.  We want to make every effort to control your discomfort so please discuss any uncontrolled symptoms with your nurse.  Your doctor will most likely have prescribed pain medications.  If you are going home after surgery you will receive individualized written care instructions before being discharged.  A responsible adult must drive you to and from the hospital on the day of surgery and stay with you for 24 hours.  If you are staying overnight following surgery, you will be transported to your hospital room following the recovery period.

## 2022-09-13 NOTE — H&P
Date: 09/12/2022 09:15 AM   Visit Type: Pre-Operative Visit   This 46 year old patient presents for Pre OP.      History of Present Illness:  1.  Pre OP   Bladder has fallen.  It is causing pressure.  Leaks all of the time as well.  In fact her main issue is leaking.  She has to wear a pad.  She leaks when she coughs sneezes or pick something up.  She is not sure if her bladder is actually fallen after discussion but thought that is what was probably causing her leakage.  She does not feel any tissue at the opening and she does not have any pressure if she stands up.    PVR 0    We discussed the options.  She has tried Kegel exercises and there has been no improvement.  She is bothered by this significantly and affects her quality of life.  She would like to proceed with surgical repair.  We will plan a retropubic TVT.  We discussed the risks and benefits in detail.                Gynecologic History  09/12/2022 09:15 AM  Date of last Pap: 02/13/2020.  Past Systemic History    Medical History  (Reviewed,updated)    Surgical History/Management  (Reviewed,updated)  Management Date Comments   Bilateral tubal ligation     D&C     Hysterectomy, total abdominal, BSO     labia cancer     laser ablation of vulva       Diagnostics History  Status Study Ordered Completed Interpretation  Result / Report   result received TRANSVAGINAL US, NON-OB 02/13/2020 02/13/2020 See module         Problem List  Problem List reviewed.   No active problems      Medications (active prior to today)  Medication Name Sig Description Start Date Stop Date Refilled Rx Elsewhere   Linzess 72 mcg capsule take 1 capsule by oral route  every day on an empty stomach at least 30 minutes before 1st meal of the day as needed 08/02/2022 09/12/2022  N     Patient Status   Completed with information received for patient transitioning into care.   Completed with information received for patient in a summary of care record.     Medication  Reconciliation  Medications reconciled today.  Patient is on no medications.    Medication Reviewed  Adherence Medication Name Sig Desc Elsewhere Status   taking as directed Linzess 72 mcg capsule take 1 capsule by oral route  every day on an empty stomach at least 30 minutes before 1st meal of the day as needed N Verified     Allergies  Ingredient Reaction (Severity) Medication Name Comment   CODEINE Dizziness           Family History    (Reviewed, updated)  M    Social History:  (Reviewed, updated)  Tobacco use reviewed.  Preferred language is English.    Tobacco use status: Heavy cigarette smoker (20-39 cigs/day).  Smoking status: Heavy tobacco smoker.    SMOKING STATUS  Type Smoking Status Usage Per Day Years Used Pack Years Total Pack Years   Cigarette Heavy tobacco smoker 1.5 Packs 35 52.5 52.5     TOBACCO CESSATION INFORMATION  Date Counseled By Order Status Description Code Tobacco Cessation Information   03/02/2020 Felisha Varela Tobacco cessation counseling completed   Smoking cessation education   09/12/2022 Jessica Leigh Tobacco cessation counseling completed   Smoking cessation education     VAPING USE  Screened for vaping? Yes  Status: Not a current user    TOBACCO/VAPING EXPOSURE  No passive vaping exposure.  No passive smoke exposure.    Comments: no exposure  ALCOHOL  There is no history of alcohol use.   CAFFEINE  The patient does not use caffeine.        Review of Systems  Review of Systems  System Neg/Pos Details   Constitutional Negative Chills, Fatigue, Fever, Malaise, Night sweats, Weight gain and Weight loss.   ENMT Negative Ear drainage, Hearing loss, Nasal drainage, Otalgia, Sinus pressure and Sore throat.   Eyes Negative Eye discharge, Eye pain and Vision changes.   Respiratory Negative Chronic cough, Cough, Dyspnea, Known TB exposure and Wheezing.   Cardio Negative Chest pain, Claudication, Edema and Irregular heartbeat/palpitations.   GI Negative Abdominal pain, Blood in stool, Change in  stool pattern, Constipation, Decreased appetite, Diarrhea, Heartburn, Nausea and Vomiting.    Negative Dysuria, Hematuria, Polyuria (Genitourinary), Urinary frequency, Urinary incontinence and Urinary retention.   Endocrine Negative Cold intolerance, Heat intolerance, Polydipsia and Polyphagia.   Neuro Negative Dizziness, Extremity weakness, Gait disturbance, Headache, Memory impairment, Numbness in extremity, Seizures and Tremors.   Psych Negative Anxiety, Depression and Insomnia.   Integumentary Negative Brittle hair, Brittle nails, Change in shape/size of mole(s), Hair loss, Hirsutism, Hives, Pruritus, Rash and Skin lesion.   MS Negative Back pain, Joint pain, Joint swelling, Muscle weakness and Neck pain.   Hema/Lymph Negative Easy bleeding, Easy bruising and Lymphadenopathy.   Allergic/Immuno Negative Contact allergy, Environmental allergies, Food allergies and Seasonal allergies.   Reproductive Negative Breast discharge and Breast lumps.     Vital Signs     Time BP mm/Hg Pulse /min Resp /min Temp F Ht ft Ht in Ht cm Wt lb Wt kg BMI kg/m2 BSA m2 O2 Sat%    9:33 /88 79 18 97.8 5 6 167.64 176.6 80.104 28.5 1.93 97     Measured By  Time Measured by    9:33 AM Jessica [facility]       Screening Summary  The following were reviewed: tobacco use, alcohol use, caffeine use, drugs of abuse and date of last pap        Physical Exam  Exam Findings Details   Constitutional Normal Well developed.   Neck Exam Normal Palpation - Normal.   Respiratory Normal Inspection - Normal.   Cardiovascular Normal Rhythm - Regular. Extra sounds - None. Murmurs - None.   Abdomen Normal Anterior palpation -  No rebound. No abdominal tenderness. No hepatic enlargement. No hernia.   Genitourinary * Pelvic deferred.   Skin Normal Inspection - Normal.   Psychiatric Normal Orientation - Oriented to time, place, person & situation. Appropriate mood and affect.       Completed Orders (This Visit)  Order Details Reason Side Interpretation Result  Additional Info Initial Treatment Date Region   Patient Health Questionnaire (PHQ-2)    Further testing is not required 0      Tobacco cessation counseling      Smoking cessation education     Prescribed activity/exercise education           Dietary management education, guidance, and counseling           Pre-Visit Planning             Assessment/Plan  # Detail Type Description    1. Assessment ANGELITO (stress urinary incontinence, female) (N39.3).         2. Assessment Dietary counseling and surveillance (Z71.3).    Patient Plan Pt to be at the hospital the day of procedure.  Pt educated to not eat or drink after midnight the day before surgery which includes water, mint or gum. You may brush your teeth.  Do NOT smoke, chew tobacco, or drink alcohol within 24 hours prior to surgery.  Wear clean, comfortable clothes and socks. Do Not wear contact lenses or make-up or dark nail polish.  Bring a case for your glasses if applicable.  Please keep your (red band - blood bank armband) and continue to wear until discharged after surgery.  Call if Questions Regarding Surgery.    Plan Orders Today's instructions / counseling include(s) Dietary management education, guidance, and counseling and Prescribed activity/exercise education .         3. Other Orders Orders not associated to today's assessments.    Plan Orders The patient had the following order(s) completed today: Patient Health Questionnaire (PHQ-2). Interpretation: Further testing is not required, Result details: 0. Today's instructions / counseling include(s) Tobacco cessation counseling.            Diagnostic History Entered Today  Performed Study Interpretation Result   09/12/2022 Pre-Visit Planning       Clinical Guidelines (Reviewed, updated)  Guidelines Status Due Action Last Addressed Comments   Depression screening completed 09/12/2023 Completed on 09/12/2022 09/12/2022    Pre-Visit Planning completed 09/19/2022 Completed on 09/12/2022 09/12/2022      Clinical  Guidelines (Declined or Excluded)  Guideline Status Due Action Last Addressed Comments   HIV Screen Declined 09/12/2022 preference         Medications (Added, Continued or Stopped today)  Start Date Medication Directions PRN Status PRN Reason Instruction Stop Date   08/02/2022 Linzess 72 mcg capsule take 1 capsule by oral route  every day on an empty stomach at least 30 minutes before 1st meal of the day as needed Y   09/12/2022     Counseling / Educational Factors  Counseling / educational factors reviewed.

## 2022-09-14 ENCOUNTER — HOSPITAL ENCOUNTER (OUTPATIENT)
Facility: HOSPITAL | Age: 47
Setting detail: SURGERY ADMIT
Discharge: HOME | End: 2022-09-14
Attending: OBSTETRICS & GYNECOLOGY | Admitting: OBSTETRICS & GYNECOLOGY

## 2022-09-14 ENCOUNTER — APPOINTMENT (OUTPATIENT)
Dept: GENERAL RADIOLOGY | Facility: HOSPITAL | Age: 47
End: 2022-09-14

## 2022-09-14 ENCOUNTER — ANESTHESIA (OUTPATIENT)
Dept: PERIOP | Facility: HOSPITAL | Age: 47
End: 2022-09-14

## 2022-09-14 ENCOUNTER — ANESTHESIA EVENT (OUTPATIENT)
Dept: PERIOP | Facility: HOSPITAL | Age: 47
End: 2022-09-14

## 2022-09-14 VITALS
WEIGHT: 174 LBS | RESPIRATION RATE: 14 BRPM | DIASTOLIC BLOOD PRESSURE: 68 MMHG | HEIGHT: 66 IN | SYSTOLIC BLOOD PRESSURE: 102 MMHG | OXYGEN SATURATION: 96 % | TEMPERATURE: 97.5 F | HEART RATE: 68 BPM | BODY MASS INDEX: 27.97 KG/M2

## 2022-09-14 DIAGNOSIS — N39.3 SUI (STRESS URINARY INCONTINENCE, FEMALE): ICD-10-CM

## 2022-09-14 PROCEDURE — 0 MEPERIDINE PER 100 MG: Performed by: NURSE ANESTHETIST, CERTIFIED REGISTERED

## 2022-09-14 PROCEDURE — 25010000002 FENTANYL CITRATE (PF) 50 MCG/ML SOLUTION: Performed by: NURSE ANESTHETIST, CERTIFIED REGISTERED

## 2022-09-14 PROCEDURE — 25010000002 CEFAZOLIN PER 500 MG: Performed by: NURSE PRACTITIONER

## 2022-09-14 PROCEDURE — C1771 REP DEV, URINARY, W/SLING: HCPCS | Performed by: OBSTETRICS & GYNECOLOGY

## 2022-09-14 PROCEDURE — 25010000002 ONDANSETRON PER 1 MG: Performed by: NURSE ANESTHETIST, CERTIFIED REGISTERED

## 2022-09-14 PROCEDURE — 25010000002 KETOROLAC TROMETHAMINE PER 15 MG: Performed by: NURSE ANESTHETIST, CERTIFIED REGISTERED

## 2022-09-14 PROCEDURE — 25010000002 MIDAZOLAM PER 1 MG: Performed by: NURSE ANESTHETIST, CERTIFIED REGISTERED

## 2022-09-14 PROCEDURE — 25010000002 PROPOFOL 10 MG/ML EMULSION: Performed by: NURSE ANESTHETIST, CERTIFIED REGISTERED

## 2022-09-14 DEVICE — DESARA BLUE TVEZ 3.0MM FOR THE TREATMENT OF FEMALE STRESS URINARY INCONTINENCE (SUI) OR MIXED INCONTINENCE
Type: IMPLANTABLE DEVICE | Site: VAGINA | Status: FUNCTIONAL
Brand: DESARA BLUE TVEZ 3.0

## 2022-09-14 RX ORDER — ONDANSETRON 2 MG/ML
INJECTION INTRAMUSCULAR; INTRAVENOUS AS NEEDED
Status: DISCONTINUED | OUTPATIENT
Start: 2022-09-14 | End: 2022-09-14 | Stop reason: SURG

## 2022-09-14 RX ORDER — KETOROLAC TROMETHAMINE 30 MG/ML
30 INJECTION, SOLUTION INTRAMUSCULAR; INTRAVENOUS EVERY 6 HOURS PRN
Status: COMPLETED | OUTPATIENT
Start: 2022-09-14 | End: 2022-09-14

## 2022-09-14 RX ORDER — ONDANSETRON 2 MG/ML
4 INJECTION INTRAMUSCULAR; INTRAVENOUS AS NEEDED
Status: DISCONTINUED | OUTPATIENT
Start: 2022-09-14 | End: 2022-09-14 | Stop reason: HOSPADM

## 2022-09-14 RX ORDER — SODIUM CHLORIDE 9 MG/ML
INJECTION, SOLUTION INTRAVENOUS AS NEEDED
Status: DISCONTINUED | OUTPATIENT
Start: 2022-09-14 | End: 2022-09-14 | Stop reason: HOSPADM

## 2022-09-14 RX ORDER — MIDAZOLAM HYDROCHLORIDE 1 MG/ML
INJECTION INTRAMUSCULAR; INTRAVENOUS AS NEEDED
Status: DISCONTINUED | OUTPATIENT
Start: 2022-09-14 | End: 2022-09-14 | Stop reason: SURG

## 2022-09-14 RX ORDER — LIDOCAINE HYDROCHLORIDE 20 MG/ML
INJECTION, SOLUTION INFILTRATION; PERINEURAL AS NEEDED
Status: DISCONTINUED | OUTPATIENT
Start: 2022-09-14 | End: 2022-09-14 | Stop reason: SURG

## 2022-09-14 RX ORDER — SODIUM CHLORIDE 0.9 % (FLUSH) 0.9 %
10 SYRINGE (ML) INJECTION AS NEEDED
Status: DISCONTINUED | OUTPATIENT
Start: 2022-09-14 | End: 2022-09-14 | Stop reason: HOSPADM

## 2022-09-14 RX ORDER — FENTANYL CITRATE 50 UG/ML
50 INJECTION, SOLUTION INTRAMUSCULAR; INTRAVENOUS
Status: DISCONTINUED | OUTPATIENT
Start: 2022-09-14 | End: 2022-09-14 | Stop reason: HOSPADM

## 2022-09-14 RX ORDER — IBUPROFEN 600 MG/1
600 TABLET ORAL EVERY 6 HOURS PRN
Qty: 30 TABLET | Refills: 0 | Status: SHIPPED | OUTPATIENT
Start: 2022-09-14 | End: 2022-10-14

## 2022-09-14 RX ORDER — SODIUM CHLORIDE, SODIUM LACTATE, POTASSIUM CHLORIDE, CALCIUM CHLORIDE 600; 310; 30; 20 MG/100ML; MG/100ML; MG/100ML; MG/100ML
125 INJECTION, SOLUTION INTRAVENOUS ONCE
Status: COMPLETED | OUTPATIENT
Start: 2022-09-14 | End: 2022-09-14

## 2022-09-14 RX ORDER — FENTANYL CITRATE 50 UG/ML
INJECTION, SOLUTION INTRAMUSCULAR; INTRAVENOUS AS NEEDED
Status: DISCONTINUED | OUTPATIENT
Start: 2022-09-14 | End: 2022-09-14 | Stop reason: SURG

## 2022-09-14 RX ORDER — HYDROCODONE BITARTRATE AND ACETAMINOPHEN 5; 325 MG/1; MG/1
1 TABLET ORAL EVERY 4 HOURS PRN
Qty: 10 TABLET | Refills: 0 | Status: SHIPPED | OUTPATIENT
Start: 2022-09-14

## 2022-09-14 RX ORDER — MAGNESIUM HYDROXIDE 1200 MG/15ML
LIQUID ORAL AS NEEDED
Status: DISCONTINUED | OUTPATIENT
Start: 2022-09-14 | End: 2022-09-14 | Stop reason: HOSPADM

## 2022-09-14 RX ORDER — SODIUM CHLORIDE 0.9 % (FLUSH) 0.9 %
10 SYRINGE (ML) INJECTION EVERY 12 HOURS SCHEDULED
Status: DISCONTINUED | OUTPATIENT
Start: 2022-09-14 | End: 2022-09-14 | Stop reason: HOSPADM

## 2022-09-14 RX ORDER — MEPERIDINE HYDROCHLORIDE 25 MG/ML
12.5 INJECTION INTRAMUSCULAR; INTRAVENOUS; SUBCUTANEOUS
Status: DISCONTINUED | OUTPATIENT
Start: 2022-09-14 | End: 2022-09-14 | Stop reason: HOSPADM

## 2022-09-14 RX ORDER — PROPOFOL 10 MG/ML
VIAL (ML) INTRAVENOUS AS NEEDED
Status: DISCONTINUED | OUTPATIENT
Start: 2022-09-14 | End: 2022-09-14 | Stop reason: SURG

## 2022-09-14 RX ORDER — SODIUM CHLORIDE, SODIUM LACTATE, POTASSIUM CHLORIDE, CALCIUM CHLORIDE 600; 310; 30; 20 MG/100ML; MG/100ML; MG/100ML; MG/100ML
100 INJECTION, SOLUTION INTRAVENOUS ONCE AS NEEDED
Status: DISCONTINUED | OUTPATIENT
Start: 2022-09-14 | End: 2022-09-14 | Stop reason: HOSPADM

## 2022-09-14 RX ORDER — FAMOTIDINE 10 MG/ML
INJECTION, SOLUTION INTRAVENOUS AS NEEDED
Status: DISCONTINUED | OUTPATIENT
Start: 2022-09-14 | End: 2022-09-14 | Stop reason: SURG

## 2022-09-14 RX ORDER — SODIUM CHLORIDE, SODIUM LACTATE, POTASSIUM CHLORIDE, CALCIUM CHLORIDE 600; 310; 30; 20 MG/100ML; MG/100ML; MG/100ML; MG/100ML
INJECTION, SOLUTION INTRAVENOUS CONTINUOUS PRN
Status: DISCONTINUED | OUTPATIENT
Start: 2022-09-14 | End: 2022-09-14 | Stop reason: SURG

## 2022-09-14 RX ORDER — IPRATROPIUM BROMIDE AND ALBUTEROL SULFATE 2.5; .5 MG/3ML; MG/3ML
3 SOLUTION RESPIRATORY (INHALATION) ONCE AS NEEDED
Status: DISCONTINUED | OUTPATIENT
Start: 2022-09-14 | End: 2022-09-14 | Stop reason: HOSPADM

## 2022-09-14 RX ORDER — MIDAZOLAM HYDROCHLORIDE 1 MG/ML
1 INJECTION INTRAMUSCULAR; INTRAVENOUS
Status: DISCONTINUED | OUTPATIENT
Start: 2022-09-14 | End: 2022-09-14 | Stop reason: HOSPADM

## 2022-09-14 RX ADMIN — ONDANSETRON 4 MG: 2 INJECTION INTRAMUSCULAR; INTRAVENOUS at 12:14

## 2022-09-14 RX ADMIN — LIDOCAINE HYDROCHLORIDE 60 MG: 20 INJECTION, SOLUTION INFILTRATION; PERINEURAL at 12:01

## 2022-09-14 RX ADMIN — FENTANYL CITRATE 50 MCG: 50 INJECTION INTRAMUSCULAR; INTRAVENOUS at 12:01

## 2022-09-14 RX ADMIN — KETOROLAC TROMETHAMINE 30 MG: 30 INJECTION, SOLUTION INTRAMUSCULAR; INTRAVENOUS at 13:01

## 2022-09-14 RX ADMIN — SODIUM CHLORIDE, POTASSIUM CHLORIDE, SODIUM LACTATE AND CALCIUM CHLORIDE 125 ML/HR: 600; 310; 30; 20 INJECTION, SOLUTION INTRAVENOUS at 10:58

## 2022-09-14 RX ADMIN — MEPERIDINE HYDROCHLORIDE 12.5 MG: 25 INJECTION, SOLUTION INTRAMUSCULAR; INTRAVENOUS; SUBCUTANEOUS at 12:59

## 2022-09-14 RX ADMIN — CEFAZOLIN 2 G: 2 INJECTION, POWDER, FOR SOLUTION INTRAMUSCULAR; INTRAVENOUS at 12:06

## 2022-09-14 RX ADMIN — MIDAZOLAM HYDROCHLORIDE 2 MG: 1 INJECTION, SOLUTION INTRAMUSCULAR; INTRAVENOUS at 11:57

## 2022-09-14 RX ADMIN — PROPOFOL 150 MG: 10 INJECTION, EMULSION INTRAVENOUS at 12:01

## 2022-09-14 RX ADMIN — FAMOTIDINE 20 MG: 10 INJECTION INTRAVENOUS at 11:57

## 2022-09-14 RX ADMIN — FENTANYL CITRATE 50 MCG: 50 INJECTION, SOLUTION INTRAMUSCULAR; INTRAVENOUS at 12:58

## 2022-09-14 RX ADMIN — FENTANYL CITRATE 50 MCG: 50 INJECTION INTRAMUSCULAR; INTRAVENOUS at 12:12

## 2022-09-14 RX ADMIN — SODIUM CHLORIDE, POTASSIUM CHLORIDE, SODIUM LACTATE AND CALCIUM CHLORIDE: 600; 310; 30; 20 INJECTION, SOLUTION INTRAVENOUS at 11:57

## 2022-09-14 NOTE — OP NOTE
TENSION FREE VAGINAL TAPING WITH OBTURATOR, CYSTOSCOPY  Procedure Note    Vicki Dietrich  9/14/2022    Pre-op Diagnosis:   Urinary stress incontinence    Post-op Diagnosis:     Urinary stress incontinence    Procedure(s):  Retropubic tension-free vaginal tape  Cystourethroscopy    Surgeon(s):  Ike Costello DO    Anesthesia: General    Estimated Blood Loss: minimal    Specimens:                * No orders in the log *      Procedure:   The patient was taken to the operating room after informed written informed consent was obtained.  She was prepped and draped in the usual sterile fashion in the dorsal lithotomy position after general anesthesia was obtained.     Next we performed the TVT.  We isolated the mid urethra between 2 Allis clamps made a 1 cm incision and dissected out laterally and then retropubically.  We then placed the TVT trocar through the incision and guided it retropubically and lateral to the urethra we hugged the pubic bone the entire time as we guided it suprapubically. We brought it out through the skin suprapubically.  We did this bilaterally.  Next we performed another cystourethroscopy making sure that we had not injured the bladder with the TVT trochars and we did not.  We then pulled the mesh through and tensioned it loosely to where we could get a pair of Metzenbaum scissors under it easily.  We irrigated it.  We then closed this incision using a running locking stitch of 2-0 Vicryl.    All sponge lap and needle counts were correct there were no complications with the procedure.    Findings: Normal bladder.  Hypermobile urethra.    Complications: none    Graft or Implants: TVT Mesh    Ike Costello DO     Date: 9/14/2022  Time: 12:38 EDT

## 2022-09-14 NOTE — ANESTHESIA POSTPROCEDURE EVALUATION
Patient: Vicki Dietrich    Procedure Summary     Date: 09/14/22 Room / Location: [facility] OR  /  [facility] OR    Anesthesia Start: 1157 Anesthesia Stop: 1235    Procedures:       TENSION FREE VAGINAL TAPING (N/A Vagina)      CYSTOSCOPY (N/A Urethra) Diagnosis: (N39.3)    Surgeons: Ike Costello DO Provider: Aki Hernandez MD    Anesthesia Type: general ASA Status: 2          Anesthesia Type: general    Vitals  Vitals Value Taken Time   BP 95/63 09/14/22 1252   Temp 97.1 °F (36.2 °C) 09/14/22 1237   Pulse 64 09/14/22 1252   Resp 14 09/14/22 1252   SpO2 94 % 09/14/22 1252           Post Anesthesia Care and Evaluation    Patient location during evaluation: PACU  Patient participation: complete - patient participated  Level of consciousness: responsive to verbal stimuli  Pain score: 2  Pain management: adequate    Airway patency: patent  Anesthetic complications: No anesthetic complications  PONV Status: none  Cardiovascular status: hemodynamically stable  Respiratory status: nasal cannula  Hydration status: acceptable

## 2022-09-14 NOTE — NURSING NOTE
Patient tolerated bladder trial well. 300mL were inserted into the bladder and she was able urinate 300mL back out.

## 2022-09-14 NOTE — ANESTHESIA PROCEDURE NOTES
Airway  Urgency: elective    Date/Time: 9/14/2022 12:03 PM    General Information and Staff    Patient location during procedure: OR    Indications and Patient Condition    Preoxygenated: yes  Mask difficulty assessment: 0 - not attempted    Final Airway Details  Final airway type: supraglottic airway      Successful airway: unique  Size 4    Number of attempts at approach: 1  Assessment: lips, teeth, and gum same as pre-op

## 2022-09-14 NOTE — ANESTHESIA PREPROCEDURE EVALUATION
Anesthesia Evaluation     Patient summary reviewed and Nursing notes reviewed   no history of anesthetic complications:  NPO Solid Status: > 8 hours  NPO Liquid Status: > 8 hours           Airway   Mallampati: II  TM distance: >3 FB  Neck ROM: full  No difficulty expected  Dental    (+) edentulous    Pulmonary - normal exam    breath sounds clear to auscultation  (+) a smoker Current Smoked day of surgery, asthma,  Cardiovascular - normal exam  Exercise tolerance: good (4-7 METS)    NYHA Classification: II  Rhythm: regular  Rate: normal    (-) past MI      Neuro/Psych  (+) numbness,    GI/Hepatic/Renal/Endo    (+) obesity,  GERD,  liver disease fatty liver disease cirrhosis,     Musculoskeletal     Abdominal   (+) obese,    Substance History - negative use     OB/GYN negative ob/gyn ROS         Other   arthritis,    history of cancer active    ROS/Med Hx Other: Spot on lung. Pending workup.                    Anesthesia Plan    ASA 2     general     intravenous induction     Anesthetic plan, risks, benefits, and alternatives have been provided, discussed and informed consent has been obtained with: patient.    Plan discussed with CRNA.

## 2022-09-27 ENCOUNTER — OFFICE VISIT (OUTPATIENT)
Dept: UROLOGY | Facility: CLINIC | Age: 47
End: 2022-09-27

## 2022-09-27 VITALS — BODY MASS INDEX: 27.97 KG/M2 | WEIGHT: 174 LBS | HEIGHT: 66 IN

## 2022-09-27 DIAGNOSIS — R35.0 FREQUENCY OF MICTURITION: ICD-10-CM

## 2022-09-27 DIAGNOSIS — R39.89 BLADDER PAIN: ICD-10-CM

## 2022-09-27 DIAGNOSIS — N30.01 ACUTE CYSTITIS WITH HEMATURIA: Primary | ICD-10-CM

## 2022-09-27 LAB
BILIRUB BLD-MCNC: NEGATIVE MG/DL
CLARITY, POC: CLEAR
COLOR UR: YELLOW
EXPIRATION DATE: ABNORMAL
GLUCOSE UR STRIP-MCNC: NEGATIVE MG/DL
KETONES UR QL: NEGATIVE
LEUKOCYTE EST, POC: ABNORMAL
Lab: ABNORMAL
NITRITE UR-MCNC: NEGATIVE MG/ML
PH UR: 6 [PH] (ref 5–8)
PROT UR STRIP-MCNC: NEGATIVE MG/DL
RBC # UR STRIP: ABNORMAL /UL
SP GR UR: 1.01 (ref 1–1.03)
UROBILINOGEN UR QL: NORMAL

## 2022-09-27 PROCEDURE — 96372 THER/PROPH/DIAG INJ SC/IM: CPT | Performed by: NURSE PRACTITIONER

## 2022-09-27 PROCEDURE — 81003 URINALYSIS AUTO W/O SCOPE: CPT | Performed by: NURSE PRACTITIONER

## 2022-09-27 PROCEDURE — 99214 OFFICE O/P EST MOD 30 MIN: CPT | Performed by: NURSE PRACTITIONER

## 2022-09-27 PROCEDURE — 87086 URINE CULTURE/COLONY COUNT: CPT | Performed by: NURSE PRACTITIONER

## 2022-09-27 RX ORDER — KETOROLAC TROMETHAMINE 30 MG/ML
60 INJECTION, SOLUTION INTRAMUSCULAR; INTRAVENOUS ONCE
Status: COMPLETED | OUTPATIENT
Start: 2022-09-27 | End: 2022-09-27

## 2022-09-27 RX ORDER — KETOROLAC TROMETHAMINE 10 MG/1
10 TABLET, FILM COATED ORAL EVERY 6 HOURS PRN
Qty: 16 TABLET | Refills: 0 | Status: SHIPPED | OUTPATIENT
Start: 2022-09-27

## 2022-09-27 RX ADMIN — KETOROLAC TROMETHAMINE 60 MG: 30 INJECTION, SOLUTION INTRAMUSCULAR; INTRAVENOUS at 09:40

## 2022-09-27 NOTE — ASSESSMENT & PLAN NOTE
[address] [facility] [facility] [address] 
[phone] Patient: Hans Garcia MRN: [id number] ZNN:[birth year] Visit Information Date & Time Provider Department Dept. Phone Encounter #  
 1/15/2018  3:00 PM Ed Jeffrey MD [facility] [phone] [id number] Follow-up Instructions Return in about 3 weeks (around 2/5/2018) for Diagnostic Test follow up. Your Appointments 1/19/2018  3:15 PM  
New Patient with Brittanie Leonard MD  
4 [facility], [address] [facility] [place] [facility] Appt Note: LEFT HIP PAIN/ REF BY ALLEN ORELLANA/*ADVISED PT TO COME EARLY W. PHOTO ID & INS. CARD, CURRENT MEDICATION LIST & [facility] [facility] [address], [address] [facility]  
[phone] [address], [address]  
  
    
 2/21/2018  5:30 PM  
ROUTINE CARE with Ivory Cisneros MD  
[facility]) Appt Note: 3mo f/u for medical conditions [facility] [address] [facility] [address] [phone]  
  
   
 [address] [address]  
  
    
 6/4/2018  3:00 PM  
Follow Up with Letha Salazar DO [facility] [place] [facility]) Appt Note: 6 month follow up [place] [id number] [phone] [place] [id number]  
  
    
 7/30/2018 12:30 PM  
Medicare Physical with Marlen Panda MD  
[facility]) Appt Note: [facility]   
 [facility] [address] [facility] [address]  
[address] [facility] [address] [facility] [place] [address] Upcoming Health Maintenance Date Due  
 GLAUCOMA SCREENING Q2Y 5/8/1996 MEDICARE YEARLY EXAM 6/27/2018 Post surgical instructions were reviewed. No intercourse. Avoid heavy lifting. The surgeon instructed her not to return to work for 6 to 8 weeks. She does not have access to short-term disability. She will investigate the possibility of her job allowing her to work light duty.   DTaP/Tdap/Td series (2 - Td) 6/26/2027 Allergies as of 1/15/2018  Review Complete On: 1/15/2018 By: Aleksandra Anderson MD  
  
 Severity Noted Reaction Type Reactions Avapro [Irbesartan]  01/21/2011   Side Effect Other (comments) Hypotension and dizziness Crestor [Rosuvastatin]  01/21/2011   Side Effect Itching, Myalgia Pcn [Penicillins]  01/21/2011   Side Effect Rash Other reaction(s): hives Plaquenil [Hydroxychloroquine]  11/15/2016    Rash Quinine  01/21/2011   Side Effect Other (comments) Other reaction(s): other/intolerance \"ringing in my ears\" Ringing in ear Current Immunizations  Reviewed on 7/11/2016 Name Date Influenza High Dose Vaccine PF 11/1/2016 Influenza Vaccine 10/1/2010 Novel Influenza-H1N1-09, All Formulations 10/1/2010 Pneumococcal Polysaccharide (PPSV-23) 5/1/2010 Not reviewed this visit You Were Diagnosed With   
  
 Codes Comments Sacral pain    -  Primary ICD-10-CM: M53.3 ICD-9-CM: 724.6 Fall, sequela     ICD-10-CM: W19. XXXS 
ICD-9-CM: 909.4, E929.3 Lumbar facet arthropathy     ICD-10-CM: M12.88 ICD-9-CM: 721.3 Muscle spasm     ICD-10-CM: J23.218 ICD-9-CM: 728.85 Left hip pain     ICD-10-CM: M25.552 ICD-9-CM: 719.45 Gait abnormality     ICD-10-CM: R26.9 ICD-9-CM: 593. 2 Vitals BP Pulse Temp Resp Height(growth percentile) Weight(growth percentile) 139/81 78 98.2 °F (36.8 °C) 18 5' 1\" (1.549 m) 128 lb (58.1 kg) SpO2 BMI OB Status Smoking Status 95% 24.19 kg/m2 Hysterectomy Former Smoker BMI and BSA Data Body Mass Index Body Surface Area  
 24.19 kg/m 2 1.58 m 2 Preferred Pharmacy Pharmacy Name Phone [facility] [address], [address] [facility] [phone] Your Updated Medication List  
  
   
This list is accurate as of: 1/15/18  4:42 PM.  Always use your most recent med list.  
  
  
  
  
 acetaminophen 325 mg tablet Commonly known as:  TYLENOL Take 325 mg by mouth as needed for Pain. aspirin delayed-release 81 mg tablet Take 81 mg by mouth daily. citalopram 10 mg tablet Commonly known as:  Emery Call Take 1 Tab by mouth daily. furosemide 40 mg tablet Commonly known as:  LASIX Take 1 Tab by mouth daily. ibuprofen 200 mg tablet Commonly known as:  MOTRIN Take 400 mg by mouth every six (6) hours as needed for Pain.  
  
 metoprolol tartrate 50 mg tablet Commonly known as:  LOPRESSOR Take 1 Tab by mouth two (2) times a day. MIRALAX 17 gram packet Generic drug:  polyethylene glycol Take 17 g by mouth daily. potassium chloride 10 mEq tablet Commonly known as:  KLOR-CON M10  
TAKE ONE TABLET BY MOUTH DAILY pravastatin 10 mg tablet Commonly known as:  PRAVACHOL Take 1 Tab by mouth daily. raNITIdine 150 mg tablet Commonly known as:  ZANTAC Take 1 Tab by mouth daily. Follow-up Instructions Return in about 3 weeks (around 2/5/2018) for Diagnostic Test follow up. To-Do List   
 01/22/2018 Imaging:  MRI PELV WO CONT   
  
 01/23/2018 8:00 AM  
  Appointment with [facility] MRI RM 2 at [address] [place] [address] ([phone]) GENERAL INSTRUCTIONS  Bring information (ID card) if you have any medically implanted devices. You will be required to lie still while the procedure is being performed. Remove any jewelry (including body piercing, hairpins) prior to MRI. If you have had a creatinine level drawn within the past 30 days, please bring most recent results to your appt. Bring any films, CD's, and reports related to your study with you on the day of your exam.  This only includes studies done outside of [address] [place] [address], [place] [address], [place], and [place].   Bring a complete list of all medications you are currently taking to include prescriptions, over-the-counter meds, herbals, vitamins & any dietary supplements. If you were given medications for claustrophobia or anxiety, please arrange to have someone drive you to your appointment. QUESTIONS  Notify the MRI Department if you have any questions concerning your study. [place] - [phone] [address] - [phone] Patient Instructions Low Back Arthritis: Exercises Your Care Instructions Here are some examples of typical rehabilitation exercises for your condition. Start each exercise slowly. Ease off the exercise if you start to have pain. Your doctor or physical therapist will tell you when you can start these exercises and which ones will work best for you. When you are not being active, find a comfortable position for rest. Some people are comfortable on the floor or a medium-firm bed with a small pillow under their head and another under their knees. Some people prefer to lie on their side with a pillow between their knees. Don't stay in one position for too long. Take short walks (10 to 20 minutes) every 2 to 3 hours. Avoid slopes, hills, and stairs until you feel better. Walk only distances you can manage without pain, especially leg pain. How to do the exercises Pelvic tilt 1. Lie on your back with your knees bent. 2. \"Brace\" your stomach-tighten your muscles by pulling in and imagining your belly button moving toward your spine. 3. Press your lower back into the floor. You should feel your hips and pelvis rock back. 4. Hold for 6 seconds while breathing smoothly. 5. Relax and allow your pelvis and hips to rock forward. 6. Repeat 8 to 12 times. Back stretches 1. Get down on your hands and knees on the floor. 2. Relax your head and allow it to droop. Round your back up toward the ceiling until you feel a nice stretch in your upper, middle, and lower back. Hold this stretch for as long as it feels comfortable, or about 15 to 30 seconds. 3. Return to the starting position with a flat back while you are on your hands and knees. 4. Let your back sway by pressing your stomach toward the floor. Lift your buttocks toward the ceiling. 5. Hold this position for 15 to 30 seconds. 6. Repeat 2 to 4 times. Follow-up care is a key part of your treatment and safety. Be sure to make and go to all appointments, and call your doctor if you are having problems. It's also a good idea to know your test results and keep a list of the medicines you take. Where can you learn more? Go to http://melvina-bridger.info/. Enter D016 in the search box to learn more about \"Low Back Arthritis: Exercises. \" Current as of: March 21, 2017 Content Version: 11.4 © 0235-9157 [employer]. Care instructions adapted under license by [employer] (which disclaims liability or warranty for this information). If you have questions about a medical condition or this instruction, always ask your healthcare professional. [address] any warranty or liability for your use of this information. Sacroiliac Pain: Exercises Your Care Instructions Here are some examples of typical rehabilitation exercises for your condition. Start each exercise slowly. Ease off the exercise if you start to have pain. Your doctor or physical therapist will tell you when you can start these exercises and which ones will work best for you. How to do the exercises Knee-to-chest stretch 7. Do not do the knee-to-chest exercise if it causes or increases back or leg pain. 8. Lie on your back with your knees bent and your feet flat on the floor. You can put a small pillow under your head and neck if it is more comfortable. 9. Grasp your hands under one knee and bring the knee to your chest, keeping the other foot flat on the floor. 10. Keep your lower back pressed to the floor. Hold for at least 15 to 30 seconds. 11. Relax and lower the knee to the starting position. Repeat with the other leg. 12. Repeat 2 to 4 times with each leg. 13. To get more stretch, keep your other leg flat on the floor while pulling your knee to your chest. 
Bridging 7. Lie on your back with both knees bent. Your knees should be bent about 90 degrees. 8. Tighten your belly muscles by pulling in your belly button toward your spine. Then push your feet into the floor, squeeze your buttocks, and lift your hips off the floor until your shoulders, hips, and knees are all in a straight line. 9. Hold for about 6 seconds as you continue to breathe normally, and then slowly lower your hips back down to the floor and rest for up to 10 seconds. 10. Repeat 8 to 12 times. Hip extension 1. Get down on your hands and knees on the floor. 2. Keeping your back and neck straight, lift one leg straight out behind you. When you lift your leg, keep your hips level. Don't let your back twist, and don't let your hip drop toward the floor. 3. Hold for 6 seconds. Repeat 8 to 12 times with each leg. 4. If you feel steady and strong when you do this exercise, you can make it more difficult. To do this, when you lift your leg, also lift the opposite arm straight out in front of you. For example, lift the left leg and the right arm at the same time. (This is sometimes called the \"bird dog exercise. \") Hold for 6 seconds, and repeat 8 to 12 times on each side. Clamshell 1. Lie on your side with a pillow under your head. Keep your feet and knees together and your knees bent. 2. Raise your top knee, but keep your feet together. Do not let your hips roll back. Your legs should open up like a clamshell. 3. Hold for 6 seconds. 4. Slowly lower your knee back down. Rest for 10 seconds. 5. Repeat 8 to 12 times. 6. Switch to your other side and repeat steps 1 through 5. Hamstring wall stretch 1. Lie on your back in a doorway, with one leg through the open door. 2. Slide your affected leg up the wall to straighten your knee. You should feel a gentle stretch down the back of your leg. 1. Do not arch your back. 2. Do not bend either knee. 3. Keep one heel touching the floor and the other heel touching the wall. Do not point your toes. 3. Hold the stretch for at least 1 minute to begin. Then try to lengthen the time you hold the stretch to as long as 6 minutes. 4. Switch legs, and repeat steps 1 through 3. 
5. Repeat 2 to 4 times. 6. If you do not have a place to do this exercise in a doorway, there is another way to do it: 
7. Lie on your back, and bend one knee. 8. Loop a towel under the ball and toes of that foot, and hold the ends of the towel in your hands. 9. Straighten your knee, and slowly pull back on the towel. You should feel a gentle stretch down the back of your leg. 10. Switch legs, and repeat steps 1 through 3. 
11. Repeat 2 to 4 times. Lower abdominal strengthening 1. Lie on your back with your knees bent and your feet flat on the floor. 2. Tighten your belly muscles by pulling your belly button in toward your spine. 3. Lift one foot off the floor and bring your knee toward your chest, so that your knee is straight above your hip and your leg is bent like the letter \"L. \" 
4. Lift the other knee up to the same position. 5. Lower one leg at a time to the starting position. 6. Keep alternating legs until you have lifted each leg 8 to 12 times. 7. Be sure to keep your belly muscles tight and your back still as you are moving your legs. Be sure to breathe normally. Piriformis stretch 1. Lie on your back with your legs straight. 2. Lift your affected leg, and bend your knee. With your opposite hand, reach across your body, and then gently pull your knee toward your opposite shoulder. 3. Hold the stretch for 15 to 30 seconds. 4. Switch legs and repeat steps 1 through 3. 
5. Repeat 2 to 4 times. Follow-up care is a key part of your treatment and safety. Be sure to make and go to all appointments, and call your doctor if you are having problems. It's also a good idea to know your test results and keep a list of the medicines you take. Where can you learn more? Go to http://melvina-bridger.info/. Enter E361 in the search box to learn more about \"Sacroiliac Pain: Exercises. \" Current as of: March 21, 2017 Content Version: 11.4 © 7311-2902 StorageByMail.com. Care instructions adapted under license by [employer] (which disclaims liability or warranty for this information). If you have questions about a medical condition or this instruction, always ask your healthcare professional. [address] any warranty or liability for your use of this information. Introducing \[facility] [place] [facility]\"" & HEALTH SERVICES! Faby Caceres introduces Mingleverse patient portal. Now you can access parts of your medical record, email your doctor's office, and request medication refills online. 1. In your internet browser, go to [email or web address] 2. Click on the First Time User? Click Here link in the Sign In box. You will see the New Member Sign Up page. 3. Enter your Mingleverse Access Code exactly as it appears below. You will not need to use this code after youve completed the sign-up process. If you do not sign up before the expiration date, you must request a new code. · Mingleverse Access Code: [email or web address] Expires: 4/15/2018  3:16 PM 
 
4. Enter the last four digits of your Social Security Number (xxxx) and Date of Birth (mm/dd/yyyy) as indicated and click Submit. You will be taken to the next sign-up page. 5. Create a Voodoo Tacot ID. This will be your Mingleverse login ID and cannot be changed, so think of one that is secure and easy to remember. 6. Create a Voodoo Tacot password. You can change your password at any time. 7. Enter your Password Reset Question and Answer. This can be used at a later time if you forget your password. 8. Enter your e-mail address. You will receive e-mail notification when new information is available in [address] 9. Click Sign Up. You can now view and download portions of your medical record. 10. Click the Download Summary menu link to download a portable copy of your medical information. If you have questions, please visit the Frequently Asked Questions section of the [employer] website. Remember, [employer] is NOT to be used for urgent needs. For medical emergencies, dial 911. Now available from your iPhone and Android! Please provide this summary of care documentation to your next provider. Your primary care clinician is listed as Tash Carlson. If you have any questions after today's visit, please call [phone].

## 2022-09-27 NOTE — PROGRESS NOTES
Chief Complaint  BLADDER PAIN POST TOT/HEMATURIA (3 MONTH FOLLOW UP FOR HEMATURIA/PAT HAD TOT 9/14/BLADDER PAIN)    Noel Dietrich presents to [facility] for HRMATURIA/BLADDER PAIN POST TOT  History of Present Illness  Ms. Vicki Dietrich is a pleasant 46-year-old female who presents to clinic today for evaluation as follow-up visit. This is her 3-month follow-up for gross/microscopic hematuria, acute cystitis, flank pain, and pelvic organ prolapse. When evaluated in clinic 3 months ago, the patient reported persistent symptoms with urinary frequency, urgency and leakage. At that time, she was wearing 4 to 6 pads daily for urinary incontinence episodes. She also reported significant bladder pain that was becoming very bothersome to her with worsening episodes of her incontinence. She reported frequent episodes of nausea. No vomiting. No chills, no fevers.    She had a lower tract investigation via cystoscopy with Dr. Louis who had determined a grade 3 cystocele. The patient wanted a procedure performed by Dr. Collins at [facility] who had performed her prior hysterectomy. So, today she is here for follow-up.    The patient reports that she had a TOT (transobturator tape) procedure performed 2 weeks ago by Dr. Pravin Ramires on 09/14/2022. She reports intermittent vaginal pain post procedure with a 7 out of 10 on the pain scale today. She describes the pain as achy. She denies difficulty urinating. She does have mild dysuria. She denies urinary incontinence or hematuria. She has chronic back pain. She has pelvic pain. She denies bowel issues. She denies constipation and takes Linzess 145 mg daily with good benefit. She denies nausea or vomiting.    She states she has pain on deep inspiration.    The patient continues to smoke 2 packs per day.    The patient has fatty liver for which she is following up with GI. She has lost some weight.    Objective  "  Vital Signs:   Ht 167.6 cm (65.98\")   Wt 78.9 kg (174 lb)   BMI 28.10 kg/m²       ROS:   Review of Systems   Constitutional: Negative for activity change, appetite change, chills, diaphoresis, fatigue, fever, unexpected weight gain and unexpected weight loss.   HENT: Negative for congestion, ear discharge, ear pain, nosebleeds, rhinorrhea, sinus pressure and sore throat.    Eyes: Negative for blurred vision, double vision, photophobia, pain, redness and visual disturbance.   Respiratory: Negative for apnea, cough, chest tightness, shortness of breath, wheezing and stridor.    Cardiovascular: Negative for chest pain and palpitations.   Gastrointestinal: Positive for abdominal distention. Negative for abdominal pain, constipation, diarrhea, nausea and vomiting.   Endocrine: Negative for polydipsia, polyphagia and polyuria.   Genitourinary: Positive for dysuria, flank pain, genital sores and hematuria. Negative for decreased urine volume, difficulty urinating, dyspareunia, frequency, pelvic pain, pelvic pressure, urgency, urinary incontinence and vaginal discharge.   Musculoskeletal: Negative for arthralgias, back pain and joint swelling.   Skin: Negative for pallor, rash and wound.   Neurological: Negative for dizziness, tremors, syncope, weakness, light-headedness, headache, memory problem and confusion.   Psychiatric/Behavioral: Positive for sleep disturbance and stress. Negative for behavioral problems and decreased concentration.        Physical Exam  Constitutional:       General: She is in acute distress.      Appearance: She is well-developed. She is obese.   HENT:      Head: Normocephalic and atraumatic.   Eyes:      Pupils: Pupils are equal, round, and reactive to light.   Neck:      Thyroid: No thyromegaly.      Trachea: No tracheal deviation.   Cardiovascular:      Rate and Rhythm: Normal rate and regular rhythm.      Heart sounds: No murmur heard.  Pulmonary:      Effort: Pulmonary effort is normal. No " respiratory distress.      Breath sounds: Normal breath sounds. No stridor. No wheezing.   Abdominal:      General: Bowel sounds are normal. There is distension.      Palpations: Abdomen is soft.      Tenderness: There is abdominal tenderness.   Genitourinary:     Labia:         Right: No tenderness.         Left: No tenderness.       Vagina: Normal. No vaginal discharge.      Comments: Post recent tot for grade 3 POP  Musculoskeletal:         General: Tenderness present. No deformity. Normal range of motion.      Cervical back: Normal range of motion.   Skin:     General: Skin is warm and dry.      Capillary Refill: Capillary refill takes less than 2 seconds.      Coloration: Skin is not pale.      Findings: No erythema or rash.   Neurological:      Mental Status: She is alert and oriented to person, place, and time.      Cranial Nerves: No cranial nerve deficit.      Sensory: No sensory deficit.      Motor: Weakness present.      Coordination: Coordination normal.   Psychiatric:         Behavior: Behavior normal.         Thought Content: Thought content normal.         Judgment: Judgment normal.       RESULTS:   Urine today reveals leukocytes, 1+, which will be sent for culture.    Result Review :     UA    Urinalysis 5/11/22 6/30/22 9/27/22   Ketones, UA Negative Negative Negative   Leukocytes, UA Negative Negative Small (1+) (A)   (A) Abnormal value            Urine Culture    Urine Culture 4/8/22 4/29/22 6/30/22   Urine Culture >100,000 CFU/mL Escherichia coli (A) No growth No growth   (A) Abnormal value                 Assessment and Plan    Problem List Items Addressed This Visit    None     Visit Diagnoses     Acute cystitis with hematuria    -  Primary    Urine revealed leukocytes 1+, sent for culture. Increase fluid intake. Avoid bladder irritants such as caffeine and harsh soap. Continue Linzess 145 mg.    Relevant Medications    ketorolac (TORADOL) 10 MG tablet    ketorolac (TORADOL) injection 60 mg  (Completed)    Other Relevant Orders    Urine Culture - Urine, Urine, Clean Catch    Frequency of micturition        Successful TOT (transobturator tape) procedure performed 2 weeks ago by Dr. Pravin Ramires on 09/14/2022.      Relevant Medications    ketorolac (TORADOL) 10 MG tablet    ketorolac (TORADOL) injection 60 mg (Completed)    Other Relevant Orders    POC Urinalysis Dipstick, Automated (Completed)    Bladder pain        For the 7 out of 10 pain, Toradol 60 mg x 1 dose administered in clinic. We will send Toradol q.6h. as needed to the pharmacy #[id number], no refills.    Relevant Medications    ketorolac (TORADOL) 10 MG tablet    ketorolac (TORADOL) injection 60 mg (Completed)          Patient reports that she is not currently experiencing any symptoms of urinary incontinence.    BMI is >= 25 and <30. (Overweight) The following options were offered after discussion;: weight loss educational material (shared in after visit summary), exercise counseling/recommendations and nutrition counseling/recommendations      RADIOLOGY (CT AND/OR KUB):    CT Abdomen and Pelvis: Results for orders placed during the hospital encounter of 06/30/22    CT Abdomen Pelvis With & Without Contrast    Narrative  EXAM:  CT Abdomen and Pelvis Without and With Intravenous Contrast    EXAM DATE:  6/30/2022 9:27 AM    CLINICAL HISTORY:  Gross hematuria; R31.0-Gross hematuria    TECHNIQUE:  Axial computed tomography images of the abdomen and pelvis without and  with intravenous contrast.  Sagittal and coronal reformatted images were  created and reviewed.  This CT exam was performed using one or more of  the following dose reduction techniques:  automated exposure control,  adjustment of the mA and/or kV according to patient size, and/or use of  iterative reconstruction technique.    COMPARISON:  04/08/2022 CT abdomen pelvis    FINDINGS:  LUNG BASES:  3.4 mm pleural-based nodule left lung base retrospect  present in April and  unchanged.    ABDOMEN:  LIVER:  Diffuse fatty change throughout the liver. The liver has and  sphenoid density reading of 74 is compared to 104 for the spleen.  GALLBLADDER AND BILE DUCTS:  Unremarkable.  No calcified stones.  No  ductal dilation.  PANCREAS:  Unremarkable.  No mass.  No ductal dilation.  SPLEEN:  See above.  ADRENALS:  Unremarkable.  No mass.  KIDNEYS AND URETERS:  Several 2 to 3 mm nonobstructing stones in the  right kidney. No calcifications in the left kidney there is no  hydronephrosis involving either kidney.  There are areas of cortical  scarring throughout the right kidney. Were there is parenchymal loss.  There is no evidence of renal mass.  STOMACH AND BOWEL:  Unremarkable.  No obstruction.  No mucosal  thickening.    PELVIS:  APPENDIX:  No findings to suggest acute appendicitis.  BLADDER:  Urinary bladder is smooth in contour. On today's study there  is no thickening of the bladder wall. It is better distended with urine.  No stones.  REPRODUCTIVE:  Unremarkable as visualized.    ABDOMEN and PELVIS:  INTRAPERITONEAL SPACE:  Unremarkable.  No free air.  No significant  fluid collection.  BONES/JOINTS:  No acute fracture.  No dislocation.  SOFT TISSUES:  Unremarkable.  VASCULATURE:  Unremarkable.  No abdominal aortic aneurysm.  LYMPH NODES:  Unremarkable.  No enlarged lymph nodes.  OTHER FINDINGS:  The delayed urogram images were unremarkable.    Impression  1.  3.4 mm noncalcified lung nodule left lung base  2.  Diffuse fatty change throughout the liver.  3.  Several 2 to 3 mm nonobstructing stones in the right kidney.  4.  Cortical scarring throughout the right kidney with loss of the  parenchymal tissue.  5.  The thickening the wall the urinary bladder noted on the previous CT  is resolved.    This report was finalized on 6/30/2022 10:08 AM by Dr. Vivek Britton II, MD.     CT Stone Protocol: No results found for this or any previous visit.     KUB: Results for orders placed in visit on  06/30/22    XR Abdomen KUB    Narrative  XR ABDOMEN KUB-    REASON FOR EXAM:  stones; R10.9-Unspecified abdominal pain    Comparison: Prior CT scan from 04/08/2022.    FINDINGS: The bowel gas pattern was unremarkable. On the KUB, no  calcifications are demonstrable overlying the kidneys. The stones seen  in the right kidney are not identifiable, possibly due to overlying gas  and stool in the bowel. No soft tissue masses were noted. In the pelvis,  there are calcifications on the right consistent with phleboliths.    Impression  A kidney stone is not demonstrable on the KUB.    This report was finalized on 6/30/2022 9:12 AM by Dr. Vivek Britton II, MD.       LABS (3 MONTHS):    Office Visit on 09/27/2022   Component Date Value Ref Range Status   • Color 09/27/2022 Yellow  Yellow, Straw, Dark Yellow, Madina Final   • Clarity, UA 09/27/2022 Clear  Clear Final   • Specific Gravity  09/27/2022 1.015  1.005 - 1.030 Final   • pH, Urine 09/27/2022 6.0  5.0 - 8.0 Final   • Leukocytes 09/27/2022 Small (1+) (A) Negative Final   • Nitrite, UA 09/27/2022 Negative  Negative Final   • Protein, POC 09/27/2022 Negative  Negative mg/dL Final   • Glucose, UA 09/27/2022 Negative  Negative mg/dL Final   • Ketones, UA 09/27/2022 Negative  Negative Final   • Urobilinogen, UA 09/27/2022 Normal  Normal, 0.2 E.U./dL Final   • Bilirubin 09/27/2022 Negative  Negative Final   • Blood, UA 09/27/2022 Trace (A) Negative Final   • Lot Number 09/27/2022 [id number]   Final   • Expiration Date 09/27/2022 12/21/23   Final   Pre-Admission Testing on 09/12/2022   Component Date Value Ref Range Status   • ABO Type 09/12/2022 O   Final   • RH type 09/12/2022 Positive   Final   • Antibody Screen 09/12/2022 Negative   Final   • T&S Expiration Date 09/12/2022 9/15/2022 11:59:59 PM   Final   • WBC 09/12/2022 8.05  3.40 - 10.80 10*3/mm3 Final   • RBC 09/12/2022 4.97  3.77 - 5.28 10*6/mm3 Final   • Hemoglobin 09/12/2022 15.4  12.0 - 15.9 g/dL Final   •  Hematocrit 09/12/2022 46.4  34.0 - 46.6 % Final   • MCV 09/12/2022 93.4  79.0 - 97.0 fL Final   • MCH 09/12/2022 31.0  26.6 - 33.0 pg Final   • MCHC 09/12/2022 33.2  31.5 - 35.7 g/dL Final   • RDW 09/12/2022 12.2 (A) 12.3 - 15.4 % Final   • RDW-SD 09/12/2022 41.9  37.0 - 54.0 fl Final   • MPV 09/12/2022 10.8  6.0 - 12.0 fL Final   • Platelets 09/12/2022 228  140 - 450 10*3/mm3 Final   • Neutrophil % 09/12/2022 48.8  42.7 - 76.0 % Final   • Lymphocyte % 09/12/2022 39.9  19.6 - 45.3 % Final   • Monocyte % 09/12/2022 7.5  5.0 - 12.0 % Final   • Eosinophil % 09/12/2022 3.0  0.3 - 6.2 % Final   • Basophil % 09/12/2022 0.7  0.0 - 1.5 % Final   • Immature Grans % 09/12/2022 0.1  0.0 - 0.5 % Final   • Neutrophils, Absolute 09/12/2022 3.93  1.70 - 7.00 10*3/mm3 Final   • Lymphocytes, Absolute 09/12/2022 3.21 (A) 0.70 - 3.10 10*3/mm3 Final   • Monocytes, Absolute 09/12/2022 0.60  0.10 - 0.90 10*3/mm3 Final   • Eosinophils, Absolute 09/12/2022 0.24  0.00 - 0.40 10*3/mm3 Final   • Basophils, Absolute 09/12/2022 0.06  0.00 - 0.20 10*3/mm3 Final   • Immature Grans, Absolute 09/12/2022 0.01  0.00 - 0.05 10*3/mm3 Final   • nRBC 09/12/2022 0.0  0.0 - 0.2 /100 WBC Final   Office Visit on 06/30/2022   Component Date Value Ref Range Status   • Color 06/30/2022 Yellow  Yellow, Straw, Dark Yellow, Madina Final   • Clarity, UA 06/30/2022 Clear  Clear Final   • Specific Gravity  06/30/2022 1.010  1.005 - 1.030 Final   • pH, Urine 06/30/2022 6.0  5.0 - 8.0 Final   • Leukocytes 06/30/2022 Negative  Negative Final   • Nitrite, UA 06/30/2022 Negative  Negative Final   • Protein, POC 06/30/2022 Negative  Negative mg/dL Final   • Glucose, UA 06/30/2022 Negative  Negative mg/dL Final   • Ketones, UA 06/30/2022 Negative  Negative Final   • Urobilinogen, UA 06/30/2022 Normal  Normal Final   • Bilirubin 06/30/2022 Negative  Negative Final   • Blood, UA 06/30/2022 Negative  Negative Final   • Lot Number 06/30/2022 [id number]   Final   • Expiration  Date 06/30/2022 12,212,023   Final   • Urine Culture 06/30/2022 No growth   Final      Tobacco use  Smoking cessation was discussed. The patient is not ready to quit smoking at this time. When she is ready, we can prescribe Chantix.    Pain on inspiration  Advised to use incentive spirometer every 15 minutes.    Post-surgical instructions  No intercourse. Avoid heavy lifting. The surgeon instructed her not to return to work for 6 to 8 weeks. She does not have access to short-term disability. She will investigate the possibility of her job allowing her to work light duty.    Fatty liver   She was advised to eat lean meat and more vegetables.    Return to the clinic in 6 months or sooner if any problems.    Smoking Cessation Counseling:  Current every day smoker. less than 3 minutes spent counseling. Not agreeable to stopping.  I advised patient to quit tobacco use and offered support.  I provided patient with tobacco cessation educational material printed in the patient's After Visit Summary.       Follow Up   Return in about 6 months (around 3/27/2023) for Next scheduled follow up, GROSS/MICRO HEMATURIA/BLADDER PAIN POST TOT.    Patient was given instructions and counseling regarding her condition or for health maintenance advice. Please see specific information pulled into the AVS if appropriate.          This document has been electronically signed by Griselda Cheng-Akwa, APRN   September 27, 2022 17:58 EDT      Dictated Utilizing Dragon Dictation: Part of this note may be an electronic transcription/translation of spoken language to printed text using the Dragon Dictation System.    Transcribed from ambient dictation for Griselda Cheng-Akwa, APRN by Fidelina Maza.  09/27/22   11:10 EDT    Patient verbalized consent to the visit recording.  I have personally performed the services described in this document as transcribed by the above individual, and it is both accurate and complete.  Griselda Cheng-Akwa, APRN   9/27/2022  17:59 EDT

## 2022-09-28 LAB — BACTERIA SPEC AEROBE CULT: NO GROWTH

## 2023-11-06 ENCOUNTER — HOSPITAL ENCOUNTER (EMERGENCY)
Facility: HOSPITAL | Age: 48
Discharge: HOME | End: 2023-11-06
Attending: EMERGENCY MEDICINE | Admitting: STUDENT IN AN ORGANIZED HEALTH CARE EDUCATION/TRAINING PROGRAM
Payer: COMMERCIAL

## 2023-11-06 ENCOUNTER — APPOINTMENT (OUTPATIENT)
Dept: ULTRASOUND IMAGING | Facility: HOSPITAL | Age: 48
End: 2023-11-06
Payer: COMMERCIAL

## 2023-11-06 ENCOUNTER — APPOINTMENT (OUTPATIENT)
Dept: CT IMAGING | Facility: HOSPITAL | Age: 48
End: 2023-11-06
Payer: COMMERCIAL

## 2023-11-06 VITALS
WEIGHT: 150 LBS | OXYGEN SATURATION: 98 % | HEIGHT: 66 IN | BODY MASS INDEX: 24.11 KG/M2 | DIASTOLIC BLOOD PRESSURE: 88 MMHG | RESPIRATION RATE: 18 BRPM | TEMPERATURE: 97.7 F | HEART RATE: 90 BPM | SYSTOLIC BLOOD PRESSURE: 144 MMHG

## 2023-11-06 DIAGNOSIS — R10.12 LEFT UPPER QUADRANT ABDOMINAL PAIN: Primary | ICD-10-CM

## 2023-11-06 LAB
ALBUMIN SERPL-MCNC: 4.5 G/DL (ref 3.5–5.2)
ALBUMIN/GLOB SERPL: 1.7 G/DL
ALP SERPL-CCNC: 103 U/L (ref 39–117)
ALT SERPL W P-5'-P-CCNC: 48 U/L (ref 1–33)
ANION GAP SERPL CALCULATED.3IONS-SCNC: 8.8 MMOL/L (ref 5–15)
AST SERPL-CCNC: 49 U/L (ref 1–32)
BASOPHILS # BLD AUTO: 0.05 10*3/MM3 (ref 0–0.2)
BASOPHILS NFR BLD AUTO: 0.7 % (ref 0–1.5)
BILIRUB SERPL-MCNC: 0.2 MG/DL (ref 0–1.2)
BUN SERPL-MCNC: 6 MG/DL (ref 6–20)
BUN/CREAT SERPL: 6.8 (ref 7–25)
CALCIUM SPEC-SCNC: 10.3 MG/DL (ref 8.6–10.5)
CHLORIDE SERPL-SCNC: 105 MMOL/L (ref 98–107)
CO2 SERPL-SCNC: 28.2 MMOL/L (ref 22–29)
CREAT SERPL-MCNC: 0.88 MG/DL (ref 0.57–1)
CRP SERPL-MCNC: <0.3 MG/DL (ref 0–0.5)
DEPRECATED RDW RBC AUTO: 41.3 FL (ref 37–54)
EGFRCR SERPLBLD CKD-EPI 2021: 81.7 ML/MIN/1.73
EOSINOPHIL # BLD AUTO: 0.26 10*3/MM3 (ref 0–0.4)
EOSINOPHIL NFR BLD AUTO: 3.4 % (ref 0.3–6.2)
ERYTHROCYTE [DISTWIDTH] IN BLOOD BY AUTOMATED COUNT: 12.2 % (ref 12.3–15.4)
GLOBULIN UR ELPH-MCNC: 2.6 GM/DL
GLUCOSE SERPL-MCNC: 115 MG/DL (ref 65–99)
HCT VFR BLD AUTO: 45.7 % (ref 34–46.6)
HGB BLD-MCNC: 14.9 G/DL (ref 12–15.9)
IMM GRANULOCYTES # BLD AUTO: 0.01 10*3/MM3 (ref 0–0.05)
IMM GRANULOCYTES NFR BLD AUTO: 0.1 % (ref 0–0.5)
LIPASE SERPL-CCNC: 26 U/L (ref 13–60)
LYMPHOCYTES # BLD AUTO: 2.87 10*3/MM3 (ref 0.7–3.1)
LYMPHOCYTES NFR BLD AUTO: 37.7 % (ref 19.6–45.3)
MCH RBC QN AUTO: 30.1 PG (ref 26.6–33)
MCHC RBC AUTO-ENTMCNC: 32.6 G/DL (ref 31.5–35.7)
MCV RBC AUTO: 92.3 FL (ref 79–97)
MONOCYTES # BLD AUTO: 0.52 10*3/MM3 (ref 0.1–0.9)
MONOCYTES NFR BLD AUTO: 6.8 % (ref 5–12)
NEUTROPHILS NFR BLD AUTO: 3.91 10*3/MM3 (ref 1.7–7)
NEUTROPHILS NFR BLD AUTO: 51.3 % (ref 42.7–76)
NRBC BLD AUTO-RTO: 0 /100 WBC (ref 0–0.2)
PLATELET # BLD AUTO: 206 10*3/MM3 (ref 140–450)
PMV BLD AUTO: 10.9 FL (ref 6–12)
POTASSIUM SERPL-SCNC: 3.9 MMOL/L (ref 3.5–5.2)
PROT SERPL-MCNC: 7.1 G/DL (ref 6–8.5)
RBC # BLD AUTO: 4.95 10*6/MM3 (ref 3.77–5.28)
SODIUM SERPL-SCNC: 142 MMOL/L (ref 136–145)
WBC NRBC COR # BLD: 7.62 10*3/MM3 (ref 3.4–10.8)

## 2023-11-06 PROCEDURE — 83690 ASSAY OF LIPASE: CPT | Performed by: PHYSICIAN ASSISTANT

## 2023-11-06 PROCEDURE — 74177 CT ABD & PELVIS W/CONTRAST: CPT

## 2023-11-06 PROCEDURE — 86140 C-REACTIVE PROTEIN: CPT | Performed by: PHYSICIAN ASSISTANT

## 2023-11-06 PROCEDURE — 80053 COMPREHEN METABOLIC PANEL: CPT | Performed by: PHYSICIAN ASSISTANT

## 2023-11-06 PROCEDURE — 99285 EMERGENCY DEPT VISIT HI MDM: CPT

## 2023-11-06 PROCEDURE — 76705 ECHO EXAM OF ABDOMEN: CPT | Performed by: RADIOLOGY

## 2023-11-06 PROCEDURE — 76705 ECHO EXAM OF ABDOMEN: CPT

## 2023-11-06 PROCEDURE — 85025 COMPLETE CBC W/AUTO DIFF WBC: CPT | Performed by: PHYSICIAN ASSISTANT

## 2023-11-06 PROCEDURE — 74177 CT ABD & PELVIS W/CONTRAST: CPT | Performed by: RADIOLOGY

## 2023-11-06 PROCEDURE — 36415 COLL VENOUS BLD VENIPUNCTURE: CPT

## 2023-11-06 PROCEDURE — 96374 THER/PROPH/DIAG INJ IV PUSH: CPT

## 2023-11-06 PROCEDURE — 25510000001 IOPAMIDOL 61 % SOLUTION: Performed by: STUDENT IN AN ORGANIZED HEALTH CARE EDUCATION/TRAINING PROGRAM

## 2023-11-06 RX ORDER — FAMOTIDINE 10 MG/ML
20 INJECTION, SOLUTION INTRAVENOUS ONCE
Status: COMPLETED | OUTPATIENT
Start: 2023-11-06 | End: 2023-11-06

## 2023-11-06 RX ORDER — PANTOPRAZOLE SODIUM 40 MG/1
40 TABLET, DELAYED RELEASE ORAL DAILY
Qty: 30 TABLET | Refills: 0 | Status: SHIPPED | OUTPATIENT
Start: 2023-11-06

## 2023-11-06 RX ORDER — SODIUM CHLORIDE 0.9 % (FLUSH) 0.9 %
10 SYRINGE (ML) INJECTION AS NEEDED
Status: DISCONTINUED | OUTPATIENT
Start: 2023-11-06 | End: 2023-11-06 | Stop reason: HOSPADM

## 2023-11-06 RX ORDER — ALUMINA, MAGNESIA, AND SIMETHICONE 2400; 2400; 240 MG/30ML; MG/30ML; MG/30ML
15 SUSPENSION ORAL ONCE
Status: COMPLETED | OUTPATIENT
Start: 2023-11-06 | End: 2023-11-06

## 2023-11-06 RX ADMIN — ALUMINUM HYDROXIDE, MAGNESIUM HYDROXIDE, AND DIMETHICONE 15 ML: 400; 400; 40 SUSPENSION ORAL at 07:32

## 2023-11-06 RX ADMIN — FAMOTIDINE 20 MG: 10 INJECTION, SOLUTION INTRAVENOUS at 07:32

## 2023-11-06 RX ADMIN — IOPAMIDOL 100 ML: 612 INJECTION, SOLUTION INTRAVENOUS at 09:17

## 2023-11-06 NOTE — Clinical Note
[facility] EMERGENCY DEPARTMENT  [address] [address]  Phone: [phone]    Vicki Dietrich was seen and treated in our emergency department on 11/6/2023.  She may return to work on 11/07/2023.         Thank you for choosing [facility].    Jennifer Scanlon, PA

## 2023-11-06 NOTE — ED PROVIDER NOTES
Subjective     History provided by:  Patient  Abdominal Pain  Pain location:  LUQ  Pain quality: aching    Pain severity:  Moderate  Onset quality:  Gradual  Duration:  12 weeks  Timing:  Constant  Progression:  Worsening  Chronicity:  New  Relieved by:  Nothing  Associated symptoms: belching    Associated symptoms: no chest pain, no dysuria and no fever        Review of Systems   Constitutional: Negative.  Negative for fever.   HENT: Negative.     Respiratory: Negative.     Cardiovascular: Negative.  Negative for chest pain.   Gastrointestinal:  Positive for abdominal pain.   Endocrine: Negative.    Genitourinary: Negative.  Negative for dysuria.   Skin: Negative.    Neurological: Negative.    Psychiatric/Behavioral: Negative.     All other systems reviewed and are negative.      Past Medical History:   Diagnosis Date    Abnormal uterine bleeding (AUB)     Acid reflux     Arthritis     Asthma     AS A CHILD    Back injury     Cancer     surgery to remove cervical cancer    Cervical cancer     COPD (chronic obstructive pulmonary disease)     Endometriosis     Fatty liver     PER CT    Female bladder prolapse     Sleep apnea        Allergies   Allergen Reactions    Codeine      States she does not like the way it makes her feel       Past Surgical History:   Procedure Laterality Date    CERVIX SURGERY      CYSTOSCOPY N/A 9/14/2022    Procedure: CYSTOSCOPY;  Surgeon: Ike Costello DO;  Location: [facility];  Service: Obstetrics/Gynecology;  Laterality: N/A;    DILATATION AND CURETTAGE      TOTAL LAPAROSCOPIC HYSTERECTOMY N/A 6/2/2020    Procedure: TOTAL LAPAROSCOPIC HYSTERECTOMY BILATERAL SALPINGECTOMY OOPHORECTOMY WITH DAVINCI ROBOT, CYSTOSCOPY;  Surgeon: Myrna Rojas DO;  Location: [facility];  Service: [facility];  Laterality: N/A;  Robot docked at 0815, undocked at 0915    TRANSVAGINAL TAPING SUSPENSION N/A 9/14/2022    Procedure: TENSION FREE VAGINAL TAPING;  Surgeon: Ike Costello DO;   Location: [facility];  Service: Obstetrics/Gynecology;  Laterality: N/A;    TUBAL ABDOMINAL LIGATION         Family History   Problem Relation Age of Onset    Cirrhosis Mother     Diabetes Mother     Heart failure Mother     Heart failure Father        Social History     Socioeconomic History    Marital status:    Tobacco Use    Smoking status: Every Day     Packs/day: 1.50     Years: 30.00     Additional pack years: 0.00     Total pack years: 45.00     Types: Cigarettes    Smokeless tobacco: Never   Vaping Use    Vaping Use: Never used   Substance and Sexual Activity    Alcohol use: No    Drug use: No    Sexual activity: Defer           Objective   Physical Exam  Vitals and nursing note reviewed.   Constitutional:       General: She is not in acute distress.     Appearance: She is well-developed. She is not diaphoretic.   HENT:      Head: Normocephalic and atraumatic.      Right Ear: External ear normal.      Left Ear: External ear normal.      Nose: Nose normal.   Eyes:      Conjunctiva/sclera: Conjunctivae normal.      Pupils: Pupils are equal, round, and reactive to light.   Neck:      Vascular: No JVD.      Trachea: No tracheal deviation.   Cardiovascular:      Rate and Rhythm: Normal rate.      Heart sounds: No murmur heard.  Pulmonary:      Effort: Pulmonary effort is normal. No respiratory distress.      Breath sounds: No wheezing.   Abdominal:      Palpations: Abdomen is soft.      Tenderness: There is abdominal tenderness in the epigastric area and left upper quadrant.   Musculoskeletal:         General: No deformity. Normal range of motion.      Cervical back: Normal range of motion and neck supple.   Skin:     General: Skin is warm and dry.      Coloration: Skin is not pale.      Findings: No erythema or rash.   Neurological:      Mental Status: She is alert and oriented to person, place, and time.      Cranial Nerves: No cranial nerve deficit.   Psychiatric:         Behavior: Behavior normal.          Thought Content: Thought content normal.         Procedures       Results for orders placed or performed during the hospital encounter of 11/06/23   Comprehensive Metabolic Panel    Specimen: Arm, Right; Blood   Result Value Ref Range    Glucose 115 (H) 65 - 99 mg/dL    BUN 6 6 - 20 mg/dL    Creatinine 0.88 0.57 - 1.00 mg/dL    Sodium 142 136 - 145 mmol/L    Potassium 3.9 3.5 - 5.2 mmol/L    Chloride 105 98 - 107 mmol/L    CO2 28.2 22.0 - 29.0 mmol/L    Calcium 10.3 8.6 - 10.5 mg/dL    Total Protein 7.1 6.0 - 8.5 g/dL    Albumin 4.5 3.5 - 5.2 g/dL    ALT (SGPT) 48 (H) 1 - 33 U/L    AST (SGOT) 49 (H) 1 - 32 U/L    Alkaline Phosphatase 103 39 - 117 U/L    Total Bilirubin 0.2 0.0 - 1.2 mg/dL    Globulin 2.6 gm/dL    A/G Ratio 1.7 g/dL    BUN/Creatinine Ratio 6.8 (L) 7.0 - 25.0    Anion Gap 8.8 5.0 - 15.0 mmol/L    eGFR 81.7 >60.0 mL/min/1.73   Lipase    Specimen: Arm, Right; Blood   Result Value Ref Range    Lipase 26 13 - 60 U/L   C-reactive Protein    Specimen: Arm, Right; Blood   Result Value Ref Range    C-Reactive Protein <0.30 0.00 - 0.50 mg/dL   CBC Auto Differential    Specimen: Arm, Right; Blood   Result Value Ref Range    WBC 7.62 3.40 - 10.80 10*3/mm3    RBC 4.95 3.77 - 5.28 10*6/mm3    Hemoglobin 14.9 12.0 - 15.9 g/dL    Hematocrit 45.7 34.0 - 46.6 %    MCV 92.3 79.0 - 97.0 fL    MCH 30.1 26.6 - 33.0 pg    MCHC 32.6 31.5 - 35.7 g/dL    RDW 12.2 (L) 12.3 - 15.4 %    RDW-SD 41.3 37.0 - 54.0 fl    MPV 10.9 6.0 - 12.0 fL    Platelets 206 140 - 450 10*3/mm3    Neutrophil % 51.3 42.7 - 76.0 %    Lymphocyte % 37.7 19.6 - 45.3 %    Monocyte % 6.8 5.0 - 12.0 %    Eosinophil % 3.4 0.3 - 6.2 %    Basophil % 0.7 0.0 - 1.5 %    Immature Grans % 0.1 0.0 - 0.5 %    Neutrophils, Absolute 3.91 1.70 - 7.00 10*3/mm3    Lymphocytes, Absolute 2.87 0.70 - 3.10 10*3/mm3    Monocytes, Absolute 0.52 0.10 - 0.90 10*3/mm3    Eosinophils, Absolute 0.26 0.00 - 0.40 10*3/mm3    Basophils, Absolute 0.05 0.00 - 0.20 10*3/mm3     Immature Grans, Absolute 0.01 0.00 - 0.05 10*3/mm3    nRBC 0.0 0.0 - 0.2 /100 WBC          ED Course  ED Course as of 11/06/23 1004   Mon Nov 06, 2023   0837 US Abdomen Limited  FINDINGS:    Liver:  Unremarkable as visualized.  No mass.  No intrahepatic bile  duct dilation.    Gallbladder:  Mild contraction of the gallbladder.  No gallstones.    Common bile duct:  Unremarkable as visualized.  No stones.  No  dilation.  Common bile duct measures 0.45 cm in diameter.    Pancreas:  Unremarkable as visualized.     IMPRESSION:    Mild contraction of the gallbladder.   []   0952 CT Abdomen Pelvis With Contrast  FINDINGS:    LUNG BASES:  Unremarkable as visualized.  No mass.  No consolidation.      ABDOMEN:    LIVER:  Fatty infiltration of the liver.    GALLBLADDER AND BILE DUCTS:  Unremarkable as visualized.  No calcified  stones.  No ductal dilation.    PANCREAS:  Unremarkable as visualized.  No mass.  No ductal dilation.    SPLEEN:  Unremarkable as visualized.  No splenomegaly.    ADRENALS:  Unremarkable as visualized.  No mass.    KIDNEYS AND URETERS:  Nonobstructive right renal calculus noted.    STOMACH AND BOWEL:  Unremarkable as visualized.  No obstruction.  No  mucosal thickening.      PELVIS:    APPENDIX:  No findings to suggest acute appendicitis.    BLADDER:  Unremarkable as visualized.  No mass.    REPRODUCTIVE:  Unremarkable as visualized.      ABDOMEN and PELVIS:    INTRAPERITONEAL SPACE:  Unremarkable as visualized.  No free air.  No  significant fluid collection.    BONES/JOINTS:  No acute fracture.  No dislocation.    SOFT TISSUES:  Unremarkable as visualized.    VASCULATURE:  Unremarkable as visualized.  No abdominal aortic  aneurysm.    LYMPH NODES:  Unremarkable as visualized.  No enlarged lymph nodes.     IMPRESSION:    No acute findings in the abdomen or pelvis.   []      ED Course User Index  [] Jennifer Scanlon PA                                           Medical Decision Making  48-year-old  female who presents to the ED today for 12 weeks of upper abdominal pain.  She states the pain is mostly in the left upper quadrant but does radiate into the right upper quadrant.  She states she is been having belching that tastes like sulfur.  She was treated with Pepcid and a GI cocktail in the ED.  This did help with her symptoms.  Ultrasound of the upper abdomen was unremarkable.  CT of the abdomen pelvis was unremarkable.  Patient will be discharged home on Protonix.  She states she does occasionally take ibuprofen at home and she was advised to stop this until she follows up.  She was given follow-up information for the GI clinic as well as the number to call to get a primary care provider in the area.  She states she has been told in the past that she has stage I liver cirrhosis.  Advised her the importance of following up with GI.  She will return to the ED if symptoms change or worsen.    Problems Addressed:  Left upper quadrant abdominal pain: complicated acute illness or injury    Amount and/or Complexity of Data Reviewed  Labs: ordered.  Radiology: ordered. Decision-making details documented in ED Course.    Risk  OTC drugs.  Prescription drug management.        Final diagnoses:   Left upper quadrant abdominal pain       ED Disposition  ED Disposition       ED Disposition   Discharge    Condition   Stable    Comment   --               Marga Gonzalez MD  [address] [facility]  [address]  [facility] [address]  [phone]    Schedule an appointment as soon as possible for a visit in 2 weeks      PATIENT CONNECTION - ALIVIA  See Provider List  [facility] [address]  [phone]  Schedule an appointment as soon as possible for a visit in 1 week           Medication List        New Prescriptions      pantoprazole 40 MG EC tablet  Commonly known as: PROTONIX  Take 1 tablet by mouth Daily.            Stop      HYDROcodone-acetaminophen 5-325 MG per tablet  Commonly known as: NORCO     ketorolac 10 MG  tablet  Commonly known as: TORADOL               Where to Get Your Medications        These medications were sent to [facility]  [place], [place] - [address] - [phone] [facility] [phone]   [address], [place] [address]      Phone: [phone]   pantoprazole 40 MG EC tablet            Jennifer Scanlon PA  11/06/23 8526

## 2023-11-06 NOTE — DISCHARGE INSTRUCTIONS
A prescription for Protonix which is a stomach pill has been sent to your pharmacy.  It is best if you take this in the morning about 30 minutes before you eat.  You will take this once a day.  Call the GI clinic to schedule the next available appointment for follow-up.  I also put a phone number on your discharge papers to help you get established with a family doctor if you need one.  Return to the ED at any time if symptoms change or worsen.

## 2024-06-22 ENCOUNTER — APPOINTMENT (OUTPATIENT)
Dept: CT IMAGING | Facility: HOSPITAL | Age: 49
End: 2024-06-22

## 2024-06-22 ENCOUNTER — HOSPITAL ENCOUNTER (EMERGENCY)
Facility: HOSPITAL | Age: 49
Discharge: HOME | End: 2024-06-22
Attending: STUDENT IN AN ORGANIZED HEALTH CARE EDUCATION/TRAINING PROGRAM

## 2024-06-22 VITALS
DIASTOLIC BLOOD PRESSURE: 78 MMHG | SYSTOLIC BLOOD PRESSURE: 134 MMHG | RESPIRATION RATE: 16 BRPM | TEMPERATURE: 97.7 F | BODY MASS INDEX: 22.5 KG/M2 | WEIGHT: 140 LBS | HEART RATE: 85 BPM | OXYGEN SATURATION: 97 % | HEIGHT: 66 IN

## 2024-06-22 DIAGNOSIS — K52.9 ENTERITIS: Primary | ICD-10-CM

## 2024-06-22 DIAGNOSIS — R10.84 ABDOMINAL PAIN, GENERALIZED: ICD-10-CM

## 2024-06-22 LAB
ALBUMIN SERPL-MCNC: 4.3 G/DL (ref 3.5–5.2)
ALBUMIN/GLOB SERPL: 1.4 G/DL
ALP SERPL-CCNC: 101 U/L (ref 39–117)
ALT SERPL W P-5'-P-CCNC: 45 U/L (ref 1–33)
AMORPH URATE CRY URNS QL MICRO: ABNORMAL /HPF
ANION GAP SERPL CALCULATED.3IONS-SCNC: 8.6 MMOL/L (ref 5–15)
AST SERPL-CCNC: 50 U/L (ref 1–32)
BACTERIA UR QL AUTO: ABNORMAL /HPF
BASOPHILS # BLD AUTO: 0.03 10*3/MM3 (ref 0–0.2)
BASOPHILS NFR BLD AUTO: 0.4 % (ref 0–1.5)
BILIRUB SERPL-MCNC: 0.6 MG/DL (ref 0–1.2)
BILIRUB UR QL STRIP: ABNORMAL
BUN SERPL-MCNC: 10 MG/DL (ref 6–20)
BUN/CREAT SERPL: 10.3 (ref 7–25)
CALCIUM SPEC-SCNC: 10 MG/DL (ref 8.6–10.5)
CHLORIDE SERPL-SCNC: 107 MMOL/L (ref 98–107)
CLARITY UR: ABNORMAL
CO2 SERPL-SCNC: 27.4 MMOL/L (ref 22–29)
COLOR UR: YELLOW
CREAT SERPL-MCNC: 0.97 MG/DL (ref 0.57–1)
DEPRECATED RDW RBC AUTO: 40.8 FL (ref 37–54)
EGFRCR SERPLBLD CKD-EPI 2021: 72.7 ML/MIN/1.73
EOSINOPHIL # BLD AUTO: 0.13 10*3/MM3 (ref 0–0.4)
EOSINOPHIL NFR BLD AUTO: 1.6 % (ref 0.3–6.2)
ERYTHROCYTE [DISTWIDTH] IN BLOOD BY AUTOMATED COUNT: 12.1 % (ref 12.3–15.4)
GLOBULIN UR ELPH-MCNC: 3 GM/DL
GLUCOSE SERPL-MCNC: 92 MG/DL (ref 65–99)
GLUCOSE UR STRIP-MCNC: NEGATIVE MG/DL
HCT VFR BLD AUTO: 44 % (ref 34–46.6)
HGB BLD-MCNC: 14.8 G/DL (ref 12–15.9)
HGB UR QL STRIP.AUTO: ABNORMAL
HOLD SPECIMEN: NORMAL
HYALINE CASTS UR QL AUTO: ABNORMAL /LPF
IMM GRANULOCYTES # BLD AUTO: 0.04 10*3/MM3 (ref 0–0.05)
IMM GRANULOCYTES NFR BLD AUTO: 0.5 % (ref 0–0.5)
KETONES UR QL STRIP: ABNORMAL
LEUKOCYTE ESTERASE UR QL STRIP.AUTO: ABNORMAL
LIPASE SERPL-CCNC: 21 U/L (ref 13–60)
LYMPHOCYTES # BLD AUTO: 2.37 10*3/MM3 (ref 0.7–3.1)
LYMPHOCYTES NFR BLD AUTO: 29.2 % (ref 19.6–45.3)
MCH RBC QN AUTO: 30.6 PG (ref 26.6–33)
MCHC RBC AUTO-ENTMCNC: 33.6 G/DL (ref 31.5–35.7)
MCV RBC AUTO: 91.1 FL (ref 79–97)
MONOCYTES # BLD AUTO: 0.43 10*3/MM3 (ref 0.1–0.9)
MONOCYTES NFR BLD AUTO: 5.3 % (ref 5–12)
NEUTROPHILS NFR BLD AUTO: 5.13 10*3/MM3 (ref 1.7–7)
NEUTROPHILS NFR BLD AUTO: 63 % (ref 42.7–76)
NITRITE UR QL STRIP: NEGATIVE
NRBC BLD AUTO-RTO: 0 /100 WBC (ref 0–0.2)
PH UR STRIP.AUTO: <=5 [PH] (ref 5–8)
PLATELET # BLD AUTO: 179 10*3/MM3 (ref 140–450)
PMV BLD AUTO: 10.9 FL (ref 6–12)
POTASSIUM SERPL-SCNC: 3.3 MMOL/L (ref 3.5–5.2)
PROT SERPL-MCNC: 7.3 G/DL (ref 6–8.5)
PROT UR QL STRIP: ABNORMAL
RBC # BLD AUTO: 4.83 10*6/MM3 (ref 3.77–5.28)
RBC # UR STRIP: ABNORMAL /HPF
REF LAB TEST METHOD: ABNORMAL
SODIUM SERPL-SCNC: 143 MMOL/L (ref 136–145)
SP GR UR STRIP: >1.03 (ref 1–1.03)
SQUAMOUS #/AREA URNS HPF: ABNORMAL /HPF
UROBILINOGEN UR QL STRIP: ABNORMAL
WBC # UR STRIP: ABNORMAL /HPF
WBC NRBC COR # BLD AUTO: 8.13 10*3/MM3 (ref 3.4–10.8)
WHOLE BLOOD HOLD COAG: NORMAL
WHOLE BLOOD HOLD SPECIMEN: NORMAL

## 2024-06-22 PROCEDURE — 80053 COMPREHEN METABOLIC PANEL: CPT | Performed by: PHYSICIAN ASSISTANT

## 2024-06-22 PROCEDURE — 96375 TX/PRO/DX INJ NEW DRUG ADDON: CPT

## 2024-06-22 PROCEDURE — 25510000001 IOPAMIDOL 61 % SOLUTION: Performed by: PHYSICIAN ASSISTANT

## 2024-06-22 PROCEDURE — 74177 CT ABD & PELVIS W/CONTRAST: CPT | Performed by: RADIOLOGY

## 2024-06-22 PROCEDURE — 36415 COLL VENOUS BLD VENIPUNCTURE: CPT

## 2024-06-22 PROCEDURE — 25010000002 ONDANSETRON PER 1 MG: Performed by: NURSE PRACTITIONER

## 2024-06-22 PROCEDURE — 74177 CT ABD & PELVIS W/CONTRAST: CPT

## 2024-06-22 PROCEDURE — 81001 URINALYSIS AUTO W/SCOPE: CPT | Performed by: PHYSICIAN ASSISTANT

## 2024-06-22 PROCEDURE — 83690 ASSAY OF LIPASE: CPT | Performed by: PHYSICIAN ASSISTANT

## 2024-06-22 PROCEDURE — 85025 COMPLETE CBC W/AUTO DIFF WBC: CPT | Performed by: PHYSICIAN ASSISTANT

## 2024-06-22 PROCEDURE — 99285 EMERGENCY DEPT VISIT HI MDM: CPT

## 2024-06-22 PROCEDURE — 96374 THER/PROPH/DIAG INJ IV PUSH: CPT

## 2024-06-22 PROCEDURE — 25010000002 HYDROMORPHONE PER 4 MG: Performed by: NURSE PRACTITIONER

## 2024-06-22 RX ORDER — ONDANSETRON 2 MG/ML
4 INJECTION INTRAMUSCULAR; INTRAVENOUS ONCE
Status: COMPLETED | OUTPATIENT
Start: 2024-06-22 | End: 2024-06-22

## 2024-06-22 RX ORDER — SODIUM CHLORIDE 0.9 % (FLUSH) 0.9 %
10 SYRINGE (ML) INJECTION AS NEEDED
Status: DISCONTINUED | OUTPATIENT
Start: 2024-06-22 | End: 2024-06-23 | Stop reason: HOSPADM

## 2024-06-22 RX ORDER — HYDROMORPHONE HYDROCHLORIDE 1 MG/ML
0.5 INJECTION, SOLUTION INTRAMUSCULAR; INTRAVENOUS; SUBCUTANEOUS ONCE
Status: COMPLETED | OUTPATIENT
Start: 2024-06-22 | End: 2024-06-22

## 2024-06-22 RX ORDER — ONDANSETRON 4 MG/1
4 TABLET, ORALLY DISINTEGRATING ORAL EVERY 6 HOURS PRN
Qty: 12 TABLET | Refills: 0 | Status: SHIPPED | OUTPATIENT
Start: 2024-06-22

## 2024-06-22 RX ORDER — KETOROLAC TROMETHAMINE 10 MG/1
10 TABLET, FILM COATED ORAL EVERY 6 HOURS PRN
Qty: 12 TABLET | Refills: 0 | Status: SHIPPED | OUTPATIENT
Start: 2024-06-22

## 2024-06-22 RX ORDER — HYDROMORPHONE HYDROCHLORIDE 1 MG/ML
0.5 INJECTION, SOLUTION INTRAMUSCULAR; INTRAVENOUS; SUBCUTANEOUS ONCE
Status: DISCONTINUED | OUTPATIENT
Start: 2024-06-22 | End: 2024-06-23 | Stop reason: HOSPADM

## 2024-06-22 RX ORDER — PROCHLORPERAZINE EDISYLATE 5 MG/ML
5 INJECTION INTRAMUSCULAR; INTRAVENOUS ONCE
Status: DISCONTINUED | OUTPATIENT
Start: 2024-06-22 | End: 2024-06-23 | Stop reason: HOSPADM

## 2024-06-22 RX ADMIN — HYDROMORPHONE HYDROCHLORIDE 0.5 MG: 1 INJECTION, SOLUTION INTRAMUSCULAR; INTRAVENOUS; SUBCUTANEOUS at 20:33

## 2024-06-22 RX ADMIN — ONDANSETRON 4 MG: 2 INJECTION INTRAMUSCULAR; INTRAVENOUS at 20:34

## 2024-06-22 RX ADMIN — IOPAMIDOL 80 ML: 612 INJECTION, SOLUTION INTRAVENOUS at 19:33

## 2024-06-22 NOTE — ED PROVIDER NOTES
Subjective   History of Present Illness  48-year-old female patient with a past medical history of COPD, GERD, and arthritis presents to the emergency room today with complaints of generalized abdominal pain, nausea vomiting and diarrhea that started today.  Patient states she has also had some numbness to her hands bilaterally.  She denies any worsening or alleviating factors.  Denies any fevers, chest pain or shortness of breath.    History provided by:  Patient   used: No        Review of Systems   Constitutional: Negative.    HENT: Negative.     Eyes: Negative.    Respiratory: Negative.     Cardiovascular: Negative.    Gastrointestinal:  Positive for abdominal pain, diarrhea, nausea and vomiting.   Endocrine: Negative.    Genitourinary: Negative.    Musculoskeletal: Negative.    Skin: Negative.    Allergic/Immunologic: Negative.    Neurological: Negative.    Hematological: Negative.    Psychiatric/Behavioral: Negative.         Past Medical History:   Diagnosis Date    Abnormal uterine bleeding (AUB)     Acid reflux     Arthritis     Asthma     AS A CHILD    Back injury     Cancer     surgery to remove cervical cancer    Cervical cancer     COPD (chronic obstructive pulmonary disease)     Endometriosis     Fatty liver     PER CT    Female bladder prolapse     Sleep apnea        Allergies   Allergen Reactions    Codeine      States she does not like the way it makes her feel    Sulfa Antibiotics Unknown - High Severity       Past Surgical History:   Procedure Laterality Date    CERVIX SURGERY      CYSTOSCOPY N/A 9/14/2022    Procedure: CYSTOSCOPY;  Surgeon: Ike Costello DO;  Location: [facility] OR;  Service: Obstetrics/Gynecology;  Laterality: N/A;    DILATATION AND CURETTAGE      TOTAL LAPAROSCOPIC HYSTERECTOMY N/A 6/2/2020    Procedure: TOTAL LAPAROSCOPIC HYSTERECTOMY BILATERAL SALPINGECTOMY OOPHORECTOMY WITH DAVINCI ROBOT, CYSTOSCOPY;  Surgeon: Myrna Rojas DO;  Location: [facility]  OR;  Service: [facility];  Laterality: N/A;  Robot docked at 0815, undocked at 0915    TRANSVAGINAL TAPING SUSPENSION N/A 9/14/2022    Procedure: TENSION FREE VAGINAL TAPING;  Surgeon: Ike Costello DO;  Location: [facility];  Service: Obstetrics/Gynecology;  Laterality: N/A;    TUBAL ABDOMINAL LIGATION         Family History   Problem Relation Age of Onset    Cirrhosis Mother     Diabetes Mother     Heart failure Mother     Heart failure Father        Social History     Socioeconomic History    Marital status:    Tobacco Use    Smoking status: Every Day     Current packs/day: 1.50     Average packs/day: 1.5 packs/day for 30.0 years (45.0 ttl pk-yrs)     Types: Cigarettes    Smokeless tobacco: Never   Vaping Use    Vaping status: Never Used   Substance and Sexual Activity    Alcohol use: No    Drug use: No    Sexual activity: Defer           Objective   Physical Exam  Vitals and nursing note reviewed.   Constitutional:       Appearance: She is well-developed and normal weight.   HENT:      Head: Normocephalic and atraumatic.      Mouth/Throat:      Mouth: Mucous membranes are moist.      Pharynx: Oropharynx is clear.   Eyes:      Extraocular Movements: Extraocular movements intact.      Pupils: Pupils are equal, round, and reactive to light.   Cardiovascular:      Rate and Rhythm: Normal rate and regular rhythm.      Heart sounds: Normal heart sounds.   Pulmonary:      Effort: Pulmonary effort is normal.      Breath sounds: Normal breath sounds.   Abdominal:      General: Abdomen is flat. Bowel sounds are normal.      Palpations: Abdomen is soft.      Tenderness: There is abdominal tenderness.   Skin:     General: Skin is warm and dry.      Capillary Refill: Capillary refill takes less than 2 seconds.   Neurological:      General: No focal deficit present.      Mental Status: She is alert and oriented to person, place, and time.   Psychiatric:         Mood and Affect: Mood normal.         Behavior:  Behavior normal.         Procedures           ED Course  ED Course as of 06/22/24 2250   Sat Jun 22, 2024 1944 Signed out to Bryce Davidson at shift change [ML]      ED Course User Index  [ML] Kari Lugo PA                                             Medical Decision Making  Amount and/or Complexity of Data Reviewed  Labs: ordered.  Radiology: ordered.    Risk  Prescription drug management.        Final diagnoses:   Enteritis   Abdominal pain, generalized       ED Disposition  ED Disposition       ED Disposition   Discharge    Condition   Stable    Comment   --               No follow-up provider specified.       Medication List        New Prescriptions      ketorolac 10 MG tablet  Commonly known as: TORADOL  Take 1 tablet by mouth Every 6 (Six) Hours As Needed for Moderate Pain.     ondansetron ODT 4 MG disintegrating tablet  Commonly known as: ZOFRAN-ODT  Place 1 tablet on the tongue Every 6 (Six) Hours As Needed for Vomiting or Nausea.               Where to Get Your Medications        These medications were sent to [place], [place] - [address] - [phone] [facility] [phone]   [address] [address]      Phone: [phone]   ketorolac 10 MG tablet  ondansetron ODT 4 MG disintegrating tablet            Stevan Davidson, APRN  06/22/24 2250

## 2024-06-22 NOTE — Clinical Note
[facility] EMERGENCY DEPARTMENT  [address] [facility] [address]  Phone: [phone]    Vicki Dietrich was seen and treated in our emergency department on 6/22/2024.  She may return to work on 06/25/2024.         Thank you for choosing [facility].    Stevan Davidson, WILLIAM

## 2024-06-22 NOTE — ED NOTES
"Patient advises she started having bilateral hand \"tingling\" approx 1 week ago. This morning she started having severe abdominal pain with diarrhea and nausea.  "

## 2024-06-23 NOTE — DISCHARGE INSTRUCTIONS
Call one of the offices below to establish a primary care provider.  If you are unable to get an appointment and feel it is an emergency and need to be seen immediately please return to the Emergency Department    Call one of the officee below to set up a primary care provider.       Dr. Anne  [address]  [place] [id number] [phone]    [facility] ([facility])  [address]  [facility] [address] [phone]    [facility] ([place])                                                                                                       [address] [facility] [address] [facility] [id number] [phone]    [facility])  [address]   [address]  [place] [address]  [phone]    [facility])  [address]  [place] [address]  Phone: [phone]    [facility]  [address] [place] [id number] [phone]    [facility])  [address] [place] [address]  Phone: [phone]    Dr. Amanda Mckay  [address] [facility] [address]  [facility] [id number] [phone]    [place] [address]  [phone]    [facility]  Janice Mcculloughs, APRN  [address] [place] [address]  [phone]    [facility]   [address]  [address]  [place] [address]  [phone]

## (undated) DEVICE — PK DAVINCI 70

## (undated) DEVICE — ANTIBACTERIAL VIOLET BRAIDED (POLYGLACTIN 910), SYNTHETIC ABSORBABLE SURGICAL SUTURE: Brand: COATED VICRYL

## (undated) DEVICE — COR MAJOR LITHOTOMY: Brand: MEDLINE INDUSTRIES, INC.

## (undated) DEVICE — GLV SURG BIOGEL LTX PF 7

## (undated) DEVICE — 2, DISPOSABLE SUCTION/IRRIGATOR WITH DISPOSABLE TIP: Brand: STRYKEFLOW

## (undated) DEVICE — COR CYSTO: Brand: MEDLINE INDUSTRIES, INC.

## (undated) DEVICE — APPL CHLORAPREP HI/LITE 26ML ORNG

## (undated) DEVICE — COVER,MAYO STAND,STERILE: Brand: MEDLINE

## (undated) DEVICE — MEGA SUTURECUT ND: Brand: ENDOWRIST

## (undated) DEVICE — DRAPE,UTILTY,TAPE,15X26, 4EA/PK: Brand: MEDLINE

## (undated) DEVICE — TIP COVER ACCESSORY

## (undated) DEVICE — SKIN AFFIX SURG ADHESIVE 72/CS 0.55ML: Brand: MEDLINE

## (undated) DEVICE — 40595 XL TRENDELENBURG POSITIONING KIT: Brand: 40595 XL TRENDELENBURG POSITIONING KIT

## (undated) DEVICE — ANTIBACTERIAL UNDYED BRAIDED (POLYGLACTIN 910), SYNTHETIC ABSORBABLE SUTURE: Brand: COATED VICRYL

## (undated) DEVICE — PREMIUM WET SKIN PREP TRAY: Brand: MEDLINE INDUSTRIES, INC.

## (undated) DEVICE — CYSTO/BLADDER IRRIGATION SET, REGULATING CLAMP

## (undated) DEVICE — CANNULA SEAL

## (undated) DEVICE — MANIP UTER ADVINCULA DELINEATOR W/ 4CM ULTEM PLSTC CUP

## (undated) DEVICE — SUT MNCRYL 4/0 PS2 18 IN

## (undated) DEVICE — ARM DRAPE

## (undated) DEVICE — BLADELESS OBTURATOR: Brand: WECK VISTA

## (undated) DEVICE — MONOPOLAR CURVED SCISSORS: Brand: ENDOWRIST

## (undated) DEVICE — ENDOPATH XCEL BLADELESS TROCARS WITH STABILITY SLEEVES: Brand: ENDOPATH XCEL

## (undated) DEVICE — VESSEL SEALER EXTEND: Brand: ENDOWRIST

## (undated) DEVICE — GOWN,REINF,POLY,ECL,PP SLV,XXL: Brand: MEDLINE

## (undated) DEVICE — GLV SURG TRIUMPH MICRO PF LTX 7 STRL

## (undated) DEVICE — PAD GRND REM POLYHESIVE A/ DISP

## (undated) DEVICE — TOTAL TRAY, 16FR 10ML SIL FOLEY, URN: Brand: MEDLINE

## (undated) DEVICE — PROGRASP FORCEPS: Brand: ENDOWRIST

## (undated) DEVICE — EVAC SMOKE LAP PLUMEAWAY 4L